# Patient Record
Sex: FEMALE | Race: BLACK OR AFRICAN AMERICAN | NOT HISPANIC OR LATINO | Employment: OTHER | URBAN - METROPOLITAN AREA
[De-identification: names, ages, dates, MRNs, and addresses within clinical notes are randomized per-mention and may not be internally consistent; named-entity substitution may affect disease eponyms.]

---

## 2023-03-04 ENCOUNTER — TRANSFERRED RECORDS (OUTPATIENT)
Dept: HEALTH INFORMATION MANAGEMENT | Facility: CLINIC | Age: 78
End: 2023-03-04
Payer: COMMERCIAL

## 2023-03-13 ENCOUNTER — MEDICAL CORRESPONDENCE (OUTPATIENT)
Dept: HEALTH INFORMATION MANAGEMENT | Facility: CLINIC | Age: 78
End: 2023-03-13
Payer: COMMERCIAL

## 2023-03-16 ENCOUNTER — APPOINTMENT (OUTPATIENT)
Dept: INTERPRETER SERVICES | Facility: CLINIC | Age: 78
End: 2023-03-16
Payer: COMMERCIAL

## 2023-03-16 DIAGNOSIS — I10 ESSENTIAL HYPERTENSION: Primary | ICD-10-CM

## 2023-03-16 NOTE — TELEPHONE ENCOUNTER
RECORDS RECEIVED FROM:    DATE RECEIVED:    NOTES STATUS DETAILS   OFFICE NOTE from referring provider  In process    OFFICE NOTE from other cardiologists  N/A    RECORDS from hospital/ED N/A    MEDICATION LIST Internal    GENERAL CARDIO RECORDS   (ALL APPOINTMENT TYPES)     LABS (CBC,BMP,CMP, TSH) Internal    EKG (STRIPS & REPORTS) In process    MONITORS (STRIPS & REPORTS) N/A    ECHOS (IMAGES AND REPORTS) N/A    STRESS TESTS (IMAGES AND REPORTS) N/A    cMRI (IMAGES AND REPORTS) N/A    CT/CTA (IMAGES AND REPORTS) N/A      Action 3/16/23 [facility]  Fax #[phone]   Action Taken Requested office notes, lab work, and EKGs    Called clinic to get records, said they'll send ASAP. 3/16

## 2023-03-17 ENCOUNTER — OFFICE VISIT (OUTPATIENT)
Dept: CARDIOLOGY | Facility: CLINIC | Age: 78
End: 2023-03-17
Attending: STUDENT IN AN ORGANIZED HEALTH CARE EDUCATION/TRAINING PROGRAM
Payer: COMMERCIAL

## 2023-03-17 ENCOUNTER — PRE VISIT (OUTPATIENT)
Dept: CARDIOLOGY | Facility: CLINIC | Age: 78
End: 2023-03-17
Payer: COMMERCIAL

## 2023-03-17 VITALS
SYSTOLIC BLOOD PRESSURE: 159 MMHG | WEIGHT: 211 LBS | HEART RATE: 58 BPM | OXYGEN SATURATION: 98 % | HEIGHT: 62 IN | DIASTOLIC BLOOD PRESSURE: 83 MMHG | BODY MASS INDEX: 38.83 KG/M2

## 2023-03-17 DIAGNOSIS — E78.5 HYPERLIPIDEMIA, UNSPECIFIED HYPERLIPIDEMIA TYPE: ICD-10-CM

## 2023-03-17 DIAGNOSIS — I10 HYPERTENSION, UNSPECIFIED TYPE: Primary | ICD-10-CM

## 2023-03-17 PROCEDURE — 99205 OFFICE O/P NEW HI 60 MIN: CPT | Performed by: STUDENT IN AN ORGANIZED HEALTH CARE EDUCATION/TRAINING PROGRAM

## 2023-03-17 PROCEDURE — 93005 ELECTROCARDIOGRAM TRACING: CPT

## 2023-03-17 PROCEDURE — G0463 HOSPITAL OUTPT CLINIC VISIT: HCPCS | Mod: 25 | Performed by: STUDENT IN AN ORGANIZED HEALTH CARE EDUCATION/TRAINING PROGRAM

## 2023-03-17 RX ORDER — CHLORTHALIDONE 25 MG/1
12.5 TABLET ORAL DAILY
Qty: 45 TABLET | Refills: 3 | Status: SHIPPED | OUTPATIENT
Start: 2023-03-17

## 2023-03-17 RX ORDER — CHOLECALCIFEROL (VITAMIN D3) 50 MCG
2 TABLET ORAL DAILY
COMMUNITY

## 2023-03-17 RX ORDER — LOSARTAN POTASSIUM 25 MG/1
25 TABLET ORAL DAILY
COMMUNITY
End: 2023-03-17

## 2023-03-17 RX ORDER — LOSARTAN POTASSIUM 50 MG/1
50 TABLET ORAL DAILY
Qty: 90 TABLET | Refills: 3 | Status: SHIPPED | OUTPATIENT
Start: 2023-03-17 | End: 2023-03-17

## 2023-03-17 RX ORDER — CHLORTHALIDONE 25 MG/1
12.5 TABLET ORAL DAILY
Qty: 45 TABLET | Refills: 3 | Status: SHIPPED | OUTPATIENT
Start: 2023-03-17 | End: 2023-03-17

## 2023-03-17 RX ORDER — UBIDECARENONE 75 MG
100 CAPSULE ORAL DAILY
COMMUNITY

## 2023-03-17 RX ORDER — LOSARTAN POTASSIUM 50 MG/1
50 TABLET ORAL DAILY
Qty: 90 TABLET | Refills: 3 | Status: SHIPPED | OUTPATIENT
Start: 2023-03-17

## 2023-03-17 NOTE — NURSING NOTE
March 17, 2023      Medication Changes:   -Increase losartan to 50 mg daily  -Start chlorthalidone 12.5 mg daily (1/2 tablet)      Follow Up:   -Fasting labs in 1 week  -Echocardiogram when able  -Follow up with Dr. Stauffer in 4 weeks      Patient verbalized understanding of all health information given and agreed to call with further questions or concerns.      Marie Alvarado RN

## 2023-03-17 NOTE — NURSING NOTE
"Chief Complaint   Patient presents with     New Patient     New [place] pt, self-referred d/t \"longstanding HTN.\"          Vitals were taken and medications reconciled.     Amelie Elena, Visit Facilitator    12:57 PM    "

## 2023-03-17 NOTE — LETTER
Date:March 20, 2023      Patient was self referred, no letter generated. Do not send.        [facility] Health Information

## 2023-03-17 NOTE — PATIENT INSTRUCTIONS
March 17, 2023    Cardiology Provider You Saw During Your Visit: Dr. Stauffer      Medication Changes:   -Increase losartan to 50 mg daily  -Start chlorthalidone 12.5 mg daily (1/2 tablet)      Follow Up:   -Fasting labs in 1 week  -Echocardiogram when able  -Follow up with Dr. Stauffer in 4 weeks        Follow the American Heart Association Diet and Lifestyle Recommendations:  -Limit saturated fat, trans fat, sodium, red meat, sweets and sugar-sweetened beverages. If you choose to eat red meat, compare labels and select the leanest cuts available.  -Aim for at least 150 minutes of moderate physical activity or 75 minutes of vigorous physical activity - or an equal combination of both - each week.      To Reach Us:  -During business hours: [phone], press option # 1 to schedule an appointment or to leave a message for your care team.     -After hours, weekends or holidays: [phone], press option #4 and ask to speak to the on-call cardiologist. Inform them you are a patient at the [facility].      Marie Alvarado RN  Cardiology Care Coordinator - General Cardiology  [facility]

## 2023-03-17 NOTE — LETTER
"3/17/2023      RE: John Lizama  [address]  [place]  [place] [address]       Dear Colleague,    Thank you for the opportunity to participate in the care of your patient, John Lizama, at the [facility] [place] at . Please see a copy of my visit note below.    Chief complaint: Hypertension     HPI:   John Lizama is a 78 year old female with history of hypertension who presents for evaluation.      Patient reports that is doing well overall denies chest pain or shortness of breath, her main complain is back pain.   She does have hypertension, recently started treatment but is taking low doses per report.     CURRENT MEDICATIONS:  Current Outpatient Medications   Medication Sig Dispense Refill     cyanocobalamin (VITAMIN B-12) 100 MCG tablet Take 100 mcg by mouth daily       losartan (COZAAR) 25 MG tablet Take 25 mg by mouth daily       magnesium 100 MG TABS        omeprazole (PRILOSEC) 20 MG DR capsule Take 20 mg by mouth daily       vitamin D3 (CHOLECALCIFEROL) 50 mcg (2000 units) tablet Take 2 tablets by mouth daily         PAST SURGICAL HISTORY:  No past surgical history on file.    ALLERGIES:   No Known Allergies    SOCIAL HISTORY:  Social History     Tobacco Use     Smoking status: Never     Passive exposure: Never     Smokeless tobacco: Never       ROS:   A comprehensive 14 point review of systems is negative other than as mentioned in HPI.    Exam:  BP (!) 159/83 (BP Location: Right arm, Patient Position: Sitting, Cuff Size: Adult Large)   Pulse 58   Ht 1.58 m (5' 2.21\")   Wt 95.7 kg (211 lb)   SpO2 98%   BMI 38.34 kg/m    GENERAL APPEARANCE: healthy, alert and no distress  EYES: no icterus, no xanthelasmas  NECK: JVP not elevated  RESPIRATORY: lungs clear to auscultation - no rales, rhonchi or wheezes, no use of accessory muscles, no retractions, respirations are unlabored, normal respiratory " rate  CARDIOVASCULAR: regular rhythm, normal S1 with physiologic split S2, no S3 or S4 and no murmur, click or rub.  GI: soft, non tender, bowel sounds normal,no abdominal bruits  EXTREMITIES: no edema, no bruits    Labs:  Reviewed.     Assessment and Plan:   John Lizama is a 78 year old female with history of hypertension who presents for evaluation.      Hypertension blood pressure above goal today  -  Increase Losartan to 50 mg   - Start chorthalidone   - BMP in one week  - Follow up in 4 to 6 weeks  - Echocardiogram     Lipid panel     Chart review time: 30 minutes  Visit time: 30 minutes  Total time spent: 60 minutes  >50% of this 30-minute visit were spent with the patient on in-person counseling and discussion regarding hypertension and coronary disease.     The patient states understanding and is agreeable with plan.     Damon Stauffer MD  Cardiology    CC            Please do not hesitate to contact me if you have any questions/concerns.     Sincerely,     Xiomy Burt MD

## 2023-03-19 LAB
ATRIAL RATE - MUSE: 68 BPM
DIASTOLIC BLOOD PRESSURE - MUSE: NORMAL MMHG
INTERPRETATION ECG - MUSE: NORMAL
P AXIS - MUSE: 47 DEGREES
PR INTERVAL - MUSE: 168 MS
QRS DURATION - MUSE: 130 MS
QT - MUSE: 402 MS
QTC - MUSE: 427 MS
R AXIS - MUSE: 27 DEGREES
SYSTOLIC BLOOD PRESSURE - MUSE: NORMAL MMHG
T AXIS - MUSE: 226 DEGREES
VENTRICULAR RATE- MUSE: 68 BPM

## 2023-03-24 ENCOUNTER — LAB (OUTPATIENT)
Dept: LAB | Facility: CLINIC | Age: 78
End: 2023-03-24
Attending: STUDENT IN AN ORGANIZED HEALTH CARE EDUCATION/TRAINING PROGRAM
Payer: COMMERCIAL

## 2023-03-24 DIAGNOSIS — E78.5 HYPERLIPIDEMIA, UNSPECIFIED HYPERLIPIDEMIA TYPE: ICD-10-CM

## 2023-03-24 DIAGNOSIS — I10 HYPERTENSION, UNSPECIFIED TYPE: ICD-10-CM

## 2023-03-24 LAB
ANION GAP SERPL CALCULATED.3IONS-SCNC: 10 MMOL/L (ref 7–15)
BUN SERPL-MCNC: 13.4 MG/DL (ref 8–23)
CALCIUM SERPL-MCNC: 10 MG/DL (ref 8.8–10.2)
CHLORIDE SERPL-SCNC: 99 MMOL/L (ref 98–107)
CHOLEST SERPL-MCNC: 196 MG/DL
CREAT SERPL-MCNC: 0.59 MG/DL (ref 0.51–0.95)
DEPRECATED HCO3 PLAS-SCNC: 25 MMOL/L (ref 22–29)
GFR SERPL CREATININE-BSD FRML MDRD: >90 ML/MIN/1.73M2
GLUCOSE SERPL-MCNC: 103 MG/DL (ref 70–99)
HDLC SERPL-MCNC: 63 MG/DL
LDLC SERPL CALC-MCNC: 114 MG/DL
NONHDLC SERPL-MCNC: 133 MG/DL
POTASSIUM SERPL-SCNC: 3.9 MMOL/L (ref 3.4–5.3)
SODIUM SERPL-SCNC: 134 MMOL/L (ref 136–145)
TRIGL SERPL-MCNC: 96 MG/DL

## 2023-03-24 PROCEDURE — 36415 COLL VENOUS BLD VENIPUNCTURE: CPT | Performed by: PATHOLOGY

## 2023-03-24 PROCEDURE — 80061 LIPID PANEL: CPT | Performed by: PATHOLOGY

## 2023-03-24 PROCEDURE — 80048 BASIC METABOLIC PNL TOTAL CA: CPT | Performed by: PATHOLOGY

## 2023-05-26 ENCOUNTER — ANCILLARY PROCEDURE (OUTPATIENT)
Dept: CARDIOLOGY | Facility: CLINIC | Age: 78
End: 2023-05-26
Attending: STUDENT IN AN ORGANIZED HEALTH CARE EDUCATION/TRAINING PROGRAM
Payer: COMMERCIAL

## 2023-05-26 VITALS
HEIGHT: 62 IN | WEIGHT: 212 LBS | DIASTOLIC BLOOD PRESSURE: 84 MMHG | SYSTOLIC BLOOD PRESSURE: 139 MMHG | HEART RATE: 74 BPM | BODY MASS INDEX: 39.01 KG/M2 | OXYGEN SATURATION: 99 %

## 2023-05-26 DIAGNOSIS — E78.5 HYPERLIPIDEMIA, UNSPECIFIED HYPERLIPIDEMIA TYPE: ICD-10-CM

## 2023-05-26 DIAGNOSIS — I10 ESSENTIAL HYPERTENSION: ICD-10-CM

## 2023-05-26 DIAGNOSIS — I10 HYPERTENSION, UNSPECIFIED TYPE: ICD-10-CM

## 2023-05-26 LAB — LVEF ECHO: NORMAL

## 2023-05-26 PROCEDURE — 93306 TTE W/DOPPLER COMPLETE: CPT | Mod: GC | Performed by: INTERNAL MEDICINE

## 2023-05-26 PROCEDURE — 99215 OFFICE O/P EST HI 40 MIN: CPT | Performed by: STUDENT IN AN ORGANIZED HEALTH CARE EDUCATION/TRAINING PROGRAM

## 2023-05-26 PROCEDURE — G0463 HOSPITAL OUTPT CLINIC VISIT: HCPCS | Performed by: STUDENT IN AN ORGANIZED HEALTH CARE EDUCATION/TRAINING PROGRAM

## 2023-05-26 RX ORDER — FOLIC ACID 1 MG/1
1 TABLET ORAL
COMMUNITY
Start: 2022-01-06

## 2023-05-26 RX ORDER — LANOLIN ALCOHOL/MO/W.PET/CERES
CREAM (GRAM) TOPICAL
COMMUNITY
Start: 2023-02-25

## 2023-05-26 RX ORDER — PREDNISOLONE ACETATE 10 MG/ML
1 SUSPENSION/ DROPS OPHTHALMIC
COMMUNITY
Start: 2023-05-11

## 2023-05-26 RX ORDER — CYCLOBENZAPRINE HCL 10 MG
TABLET ORAL
COMMUNITY

## 2023-05-26 RX ORDER — AMLODIPINE AND VALSARTAN 5; 160 MG/1; MG/1
1 TABLET ORAL
COMMUNITY
Start: 2023-02-25

## 2023-05-26 RX ORDER — HYDROCORTISONE 25 MG/ML
LOTION TOPICAL
COMMUNITY
Start: 2022-12-09

## 2023-05-26 RX ORDER — GABAPENTIN 100 MG/1
CAPSULE ORAL
COMMUNITY
Start: 2022-06-24

## 2023-05-26 RX ORDER — LISINOPRIL AND HYDROCHLOROTHIAZIDE 12.5; 2 MG/1; MG/1
TABLET ORAL
COMMUNITY

## 2023-05-26 RX ORDER — PANTOPRAZOLE SODIUM 40 MG/1
TABLET, DELAYED RELEASE ORAL
COMMUNITY
Start: 2023-04-27

## 2023-05-26 RX ORDER — ATORVASTATIN CALCIUM 10 MG/1
10 TABLET, FILM COATED ORAL DAILY
Qty: 90 TABLET | Refills: 3 | Status: SHIPPED | OUTPATIENT
Start: 2023-05-26

## 2023-05-26 RX ORDER — LOSARTAN POTASSIUM AND HYDROCHLOROTHIAZIDE 12.5; 1 MG/1; MG/1
TABLET ORAL
COMMUNITY

## 2023-05-26 RX ORDER — OMEPRAZOLE 40 MG/1
CAPSULE, DELAYED RELEASE ORAL
COMMUNITY
Start: 2022-08-31

## 2023-05-26 RX ORDER — FERROUS SULFATE 325(65) MG
TABLET ORAL
COMMUNITY

## 2023-05-26 RX ORDER — SENNA AND DOCUSATE SODIUM 50; 8.6 MG/1; MG/1
TABLET, FILM COATED ORAL
COMMUNITY

## 2023-05-26 RX ORDER — OFLOXACIN 3 MG/ML
SOLUTION/ DROPS OPHTHALMIC
COMMUNITY
Start: 2023-05-11

## 2023-05-26 RX ORDER — ACETAMINOPHEN 160 MG
1 TABLET,DISINTEGRATING ORAL DAILY
COMMUNITY
Start: 2022-06-24

## 2023-05-26 RX ORDER — PREDNISONE 2.5 MG/1
1 TABLET ORAL EVERY 24 HOURS
COMMUNITY
Start: 2022-01-06

## 2023-05-26 RX ORDER — ACETAMINOPHEN 500 MG
TABLET ORAL
COMMUNITY

## 2023-05-26 RX ORDER — CAPSAICIN 0.025 %
CREAM (GRAM) TOPICAL
COMMUNITY
Start: 2022-06-07

## 2023-05-26 ASSESSMENT — PAIN SCALES - GENERAL: PAINLEVEL: NO PAIN (0)

## 2023-05-26 NOTE — NURSING NOTE
May 26, 2023    Medication Changes: Start atorvastatin 10 mg daily      Follow Up:   With Dr. Stauffer in 6-12 months with fasting labs and echocardiogram prior    Patient verbalized understanding of all health information given and agreed to call with further questions or concerns.      Marie Alvarado RN

## 2023-05-26 NOTE — PATIENT INSTRUCTIONS
May 26, 2023    Cardiology Provider You Saw During Your Visit: Dr. Stauffer      Medication Changes: Start atorvastatin 10 mg daily      Follow Up:   With Dr. Stauffer in 6-12 months with fasting labs and echocardiogram prior      Follow the American Heart Association Diet and Lifestyle Recommendations:  -Limit saturated fat, trans fat, sodium, red meat, sweets and sugar-sweetened beverages. If you choose to eat red meat, compare labels and select the leanest cuts available.  -Aim for at least 150 minutes of moderate physical activity or 75 minutes of vigorous physical activity - or an equal combination of both - each week.      To Reach Us:  -During business hours: [phone], press option # 1 to schedule an appointment or to leave a message for your care team.     -After hours, weekends or holidays: [phone], press option #4 and ask to speak to the on-call cardiologist. Inform them you are a patient at the [facility].        **If you have a cardiac device, please make sure you schedule an in-person device check just prior to your cardiology provider appointments**        Marie Alvarado RN  Cardiology Care Coordinator - General Cardiology  [facility]

## 2023-05-26 NOTE — NURSING NOTE
"Chief Complaint   Patient presents with     FU Cardiac testing      2 month follow-up with echo prior        Initial /84   Pulse 74   Ht 1.58 m (5' 2.2\")   Wt 96.2 kg (212 lb)   SpO2 99%   BMI 38.53 kg/m   Estimated body mass index is 38.53 kg/m  as calculated from the following:    Height as of this encounter: 1.58 m (5' 2.2\").    Weight as of this encounter: 96.2 kg (212 lb)..  BP completed using cuff size: trang Tejada CMA (AAMA)  "

## 2023-05-26 NOTE — PROGRESS NOTES
"Chief complaint: Hypertension     HPI:   John Lizama is a 78 year old female with history of hypertension who presents for evaluation.      Patient reports that is doing well overall denies chest pain or shortness of breath, her main complain is back pain.   She does have hypertension, recently started treatment but is taking low doses per report.     CURRENT MEDICATIONS:  Current Outpatient Medications   Medication Sig Dispense Refill     chlorthalidone (HYGROTON) 25 MG tablet Take 0.5 tablets (12.5 mg) by mouth daily 45 tablet 3     cyanocobalamin (VITAMIN B-12) 100 MCG tablet Take 100 mcg by mouth daily       losartan (COZAAR) 50 MG tablet Take 1 tablet (50 mg) by mouth daily 90 tablet 3     magnesium 100 MG TABS        omeprazole (PRILOSEC) 20 MG DR capsule Take 20 mg by mouth daily       vitamin D3 (CHOLECALCIFEROL) 50 mcg (2000 units) tablet Take 2 tablets by mouth daily         PAST SURGICAL HISTORY:  No past surgical history on file.    ALLERGIES:   No Known Allergies    SOCIAL HISTORY:  Social History     Tobacco Use     Smoking status: Never     Passive exposure: Never     Smokeless tobacco: Never       ROS:   A comprehensive 14 point review of systems is negative other than as mentioned in HPI.    Exam:  /84   Pulse 74   Ht 1.58 m (5' 2.2\")   Wt 96.2 kg (212 lb)   SpO2 99%   BMI 38.53 kg/m    GENERAL APPEARANCE: healthy, alert and no distress  EYES: no icterus, no xanthelasmas  NECK: JVP not elevated  RESPIRATORY: lungs clear to auscultation - no rales, rhonchi or wheezes, no use of accessory muscles, no retractions, respirations are unlabored, normal respiratory rate  CARDIOVASCULAR: regular rhythm, normal S1 with physiologic split S2, no S3 or S4 and 2/6 systolic murmur, click or rub.  GI: soft, non tender, bowel sounds normal,no abdominal bruits  EXTREMITIES: no edema, no bruits    Labs:  Reviewed.   LDL Cholesterol Calculated   Date Value Ref Range Status   03/24/2023 114 (H) <=100 " mg/dL Final       Assessment and Plan:   John Lizama is a 78 year old female with history of hypertension who presents for evaluation.      Hypertension blood pressure better controlled.   -  Continue Losartan to 50 mg   - chorthalidone 12.5 mg     HLD was on atorvastatin 10 mg in the past, plan to resume.   - Atorvastatin 10 mg     Chart review time: 30 minutes  Visit time: 30 minutes  Total time spent: 60 minutes  >50% of this 30-minute visit were spent with the patient on in-person counseling and discussion regarding hypertension and coronary disease.     The patient states understanding and is agreeable with plan.     Damon Stauffer MD  Cardiology    CC

## 2023-06-05 ENCOUNTER — TRANSCRIBE ORDERS (OUTPATIENT)
Dept: OTHER | Age: 78
End: 2023-06-05

## 2023-06-05 DIAGNOSIS — I10 ESSENTIAL HYPERTENSION: Primary | ICD-10-CM

## 2023-07-26 NOTE — PROGRESS NOTES
"Chief complaint: Hypertension     HPI:   John Lizama is a 78 year old female with history of hypertension who presents for evaluation.      Patient reports that is doing well overall denies chest pain or shortness of breath, her main complain is back pain.   She does have hypertension, recently started treatment but is taking low doses per report.     CURRENT MEDICATIONS:  Current Outpatient Medications   Medication Sig Dispense Refill     cyanocobalamin (VITAMIN B-12) 100 MCG tablet Take 100 mcg by mouth daily       losartan (COZAAR) 25 MG tablet Take 25 mg by mouth daily       magnesium 100 MG TABS        omeprazole (PRILOSEC) 20 MG DR capsule Take 20 mg by mouth daily       vitamin D3 (CHOLECALCIFEROL) 50 mcg (2000 units) tablet Take 2 tablets by mouth daily         PAST SURGICAL HISTORY:  No past surgical history on file.    ALLERGIES:   No Known Allergies    SOCIAL HISTORY:  Social History     Tobacco Use     Smoking status: Never     Passive exposure: Never     Smokeless tobacco: Never       ROS:   A comprehensive 14 point review of systems is negative other than as mentioned in HPI.    Exam:  BP (!) 159/83 (BP Location: Right arm, Patient Position: Sitting, Cuff Size: Adult Large)   Pulse 58   Ht 1.58 m (5' 2.21\")   Wt 95.7 kg (211 lb)   SpO2 98%   BMI 38.34 kg/m    GENERAL APPEARANCE: healthy, alert and no distress  EYES: no icterus, no xanthelasmas  NECK: JVP not elevated  RESPIRATORY: lungs clear to auscultation - no rales, rhonchi or wheezes, no use of accessory muscles, no retractions, respirations are unlabored, normal respiratory rate  CARDIOVASCULAR: regular rhythm, normal S1 with physiologic split S2, no S3 or S4 and no murmur, click or rub.  GI: soft, non tender, bowel sounds normal,no abdominal bruits  EXTREMITIES: no edema, no bruits    Labs:  Reviewed.     Assessment and Plan:   John Lizama is a 78 year old female with history of hypertension who presents for evaluation.  "     Hypertension blood pressure above goal today  -  Increase Losartan to 50 mg   - Start chorthalidone   - BMP in one week  - Follow up in 4 to 6 weeks  - Echocardiogram     Lipid panel     Chart review time: 30 minutes  Visit time: 30 minutes  Total time spent: 60 minutes  >50% of this 30-minute visit were spent with the patient on in-person counseling and discussion regarding hypertension and coronary disease.     The patient states understanding and is agreeable with plan.     Damon Stauffer MD  Cardiology    CC         36.6

## 2024-07-06 DIAGNOSIS — E78.5 HYPERLIPIDEMIA, UNSPECIFIED HYPERLIPIDEMIA TYPE: ICD-10-CM

## 2024-07-09 RX ORDER — ATORVASTATIN CALCIUM 10 MG/1
10 TABLET, FILM COATED ORAL DAILY
Qty: 90 TABLET | Refills: 3 | OUTPATIENT
Start: 2024-07-09

## 2024-10-21 ENCOUNTER — TELEPHONE (OUTPATIENT)
Dept: PLASTIC SURGERY | Facility: CLINIC | Age: 79
End: 2024-10-21
Payer: COMMERCIAL

## 2024-10-21 NOTE — TELEPHONE ENCOUNTER
[facility]    Phone Message    May a detailed message be left on voicemail: yes     Reason for Call: Other: Pt being referred to plastic surgery from [facility] from Dr Ge, referral not on pt's file. Diagnosis is fluid build up around butt and hips. Writer called backline, per backline nurse send a TE to the clinic.      Action Taken: Other: plast surg     Travel Screening: Not Applicable     Date of Service:

## 2024-10-21 NOTE — TELEPHONE ENCOUNTER
Called pt today with Arabella . Pt did not answer but I was able to leave a voicemail with information that I was calling regarding plastic surgery referral. Dex GEE RN

## 2024-10-22 ENCOUNTER — PATIENT OUTREACH (OUTPATIENT)
Dept: PLASTIC SURGERY | Facility: CLINIC | Age: 79
End: 2024-10-22
Payer: COMMERCIAL

## 2024-10-22 NOTE — PATIENT INSTRUCTIONS
Pt's grand daughter, Priti returned phone call today to discuss plastic surgery referral. As we were discussing the referral, she updated that Dr Ge referred pt to [facility] and they were supposed to call them to make appt, not [facility]. Priti has my contact information if anything changes. Dex GEE RN

## 2024-11-25 NOTE — LETTER
"5/26/2023      RE: John Lizama  [address]  [place]  [facility] [address]       Dear Colleague,    Thank you for the opportunity to participate in the care of your patient, John Lizama, at the [facility] [place] at [facility]. Please see a copy of my visit note below.    Chief complaint: Hypertension     HPI:   John Lizama is a 78 year old female with history of hypertension who presents for evaluation.      Patient reports that is doing well overall denies chest pain or shortness of breath, her main complain is back pain.   She does have hypertension, recently started treatment but is taking low doses per report.     CURRENT MEDICATIONS:  Current Outpatient Medications   Medication Sig Dispense Refill     chlorthalidone (HYGROTON) 25 MG tablet Take 0.5 tablets (12.5 mg) by mouth daily 45 tablet 3     cyanocobalamin (VITAMIN B-12) 100 MCG tablet Take 100 mcg by mouth daily       losartan (COZAAR) 50 MG tablet Take 1 tablet (50 mg) by mouth daily 90 tablet 3     magnesium 100 MG TABS        omeprazole (PRILOSEC) 20 MG DR capsule Take 20 mg by mouth daily       vitamin D3 (CHOLECALCIFEROL) 50 mcg (2000 units) tablet Take 2 tablets by mouth daily         PAST SURGICAL HISTORY:  No past surgical history on file.    ALLERGIES:   No Known Allergies    SOCIAL HISTORY:  Social History     Tobacco Use     Smoking status: Never     Passive exposure: Never     Smokeless tobacco: Never       ROS:   A comprehensive 14 point review of systems is negative other than as mentioned in HPI.    Exam:  /84   Pulse 74   Ht 1.58 m (5' 2.2\")   Wt 96.2 kg (212 lb)   SpO2 99%   BMI 38.53 kg/m    GENERAL APPEARANCE: healthy, alert and no distress  EYES: no icterus, no xanthelasmas  NECK: JVP not elevated  RESPIRATORY: lungs clear to auscultation - no rales, rhonchi or wheezes, no use of accessory muscles, no retractions, respirations are unlabored, " normal respiratory rate  CARDIOVASCULAR: regular rhythm, normal S1 with physiologic split S2, no S3 or S4 and 2/6 systolic murmur, click or rub.  GI: soft, non tender, bowel sounds normal,no abdominal bruits  EXTREMITIES: no edema, no bruits    Labs:  Reviewed.   LDL Cholesterol Calculated   Date Value Ref Range Status   03/24/2023 114 (H) <=100 mg/dL Final       Assessment and Plan:   John Lizama is a 78 year old female with history of hypertension who presents for evaluation.      Hypertension blood pressure better controlled.   -  Continue Losartan to 50 mg   - chorthalidone 12.5 mg     HLD was on atorvastatin 10 mg in the past, plan to resume.   - Atorvastatin 10 mg     Chart review time: 30 minutes  Visit time: 30 minutes  Total time spent: 60 minutes  >50% of this 30-minute visit were spent with the patient on in-person counseling and discussion regarding hypertension and coronary disease.     The patient states understanding and is agreeable with plan.     Damon Stauffer MD  Cardiology    CC            Please do not hesitate to contact me if you have any questions/concerns.     Sincerely,     Xiomy Burt MD     Skin normal color for race, warm, dry and intact. No evidence of rash.

## 2025-02-17 ENCOUNTER — ANESTHESIA EVENT (OUTPATIENT)
Dept: SURGERY | Facility: CLINIC | Age: 80
End: 2025-02-17
Payer: COMMERCIAL

## 2025-02-18 ENCOUNTER — HOSPITAL ENCOUNTER (INPATIENT)
Facility: CLINIC | Age: 80
DRG: 516 | End: 2025-02-18
Attending: STUDENT IN AN ORGANIZED HEALTH CARE EDUCATION/TRAINING PROGRAM | Admitting: STUDENT IN AN ORGANIZED HEALTH CARE EDUCATION/TRAINING PROGRAM
Payer: COMMERCIAL

## 2025-02-18 ENCOUNTER — ANESTHESIA (OUTPATIENT)
Dept: SURGERY | Facility: CLINIC | Age: 80
End: 2025-02-18
Payer: COMMERCIAL

## 2025-02-18 ENCOUNTER — APPOINTMENT (OUTPATIENT)
Dept: GENERAL RADIOLOGY | Facility: CLINIC | Age: 80
DRG: 516 | End: 2025-02-18
Attending: STUDENT IN AN ORGANIZED HEALTH CARE EDUCATION/TRAINING PROGRAM
Payer: COMMERCIAL

## 2025-02-18 DIAGNOSIS — Z98.890 HISTORY OF LUMBAR LAMINECTOMY: Primary | ICD-10-CM

## 2025-02-18 PROBLEM — M48.061 LUMBAR STENOSIS: Status: ACTIVE | Noted: 2025-02-18

## 2025-02-18 LAB
CREAT SERPL-MCNC: 0.85 MG/DL (ref 0.51–0.95)
EGFRCR SERPLBLD CKD-EPI 2021: 68 ML/MIN/1.73M2
LACTATE SERPL-SCNC: 1.9 MMOL/L (ref 0.7–2)

## 2025-02-18 PROCEDURE — 360N000084 HC SURGERY LEVEL 4 W/ FLUORO, PER MIN: Performed by: STUDENT IN AN ORGANIZED HEALTH CARE EDUCATION/TRAINING PROGRAM

## 2025-02-18 PROCEDURE — 250N000009 HC RX 250: Performed by: NURSE ANESTHETIST, CERTIFIED REGISTERED

## 2025-02-18 PROCEDURE — 258N000003 HC RX IP 258 OP 636: Performed by: ANESTHESIOLOGY

## 2025-02-18 PROCEDURE — 258N000003 HC RX IP 258 OP 636: Performed by: STUDENT IN AN ORGANIZED HEALTH CARE EDUCATION/TRAINING PROGRAM

## 2025-02-18 PROCEDURE — 250N000009 HC RX 250: Performed by: STUDENT IN AN ORGANIZED HEALTH CARE EDUCATION/TRAINING PROGRAM

## 2025-02-18 PROCEDURE — 82565 ASSAY OF CREATININE: CPT | Performed by: STUDENT IN AN ORGANIZED HEALTH CARE EDUCATION/TRAINING PROGRAM

## 2025-02-18 PROCEDURE — 370N000017 HC ANESTHESIA TECHNICAL FEE, PER MIN: Performed by: STUDENT IN AN ORGANIZED HEALTH CARE EDUCATION/TRAINING PROGRAM

## 2025-02-18 PROCEDURE — 250N000025 HC SEVOFLURANE, PER MIN: Performed by: STUDENT IN AN ORGANIZED HEALTH CARE EDUCATION/TRAINING PROGRAM

## 2025-02-18 PROCEDURE — 250N000011 HC RX IP 250 OP 636: Performed by: STUDENT IN AN ORGANIZED HEALTH CARE EDUCATION/TRAINING PROGRAM

## 2025-02-18 PROCEDURE — 01NR0ZZ RELEASE SACRAL NERVE, OPEN APPROACH: ICD-10-PCS | Performed by: STUDENT IN AN ORGANIZED HEALTH CARE EDUCATION/TRAINING PROGRAM

## 2025-02-18 PROCEDURE — 01NB0ZZ RELEASE LUMBAR NERVE, OPEN APPROACH: ICD-10-PCS | Performed by: STUDENT IN AN ORGANIZED HEALTH CARE EDUCATION/TRAINING PROGRAM

## 2025-02-18 PROCEDURE — 120N000001 HC R&B MED SURG/OB

## 2025-02-18 PROCEDURE — 999N000141 HC STATISTIC PRE-PROCEDURE NURSING ASSESSMENT: Performed by: STUDENT IN AN ORGANIZED HEALTH CARE EDUCATION/TRAINING PROGRAM

## 2025-02-18 PROCEDURE — 36415 COLL VENOUS BLD VENIPUNCTURE: CPT | Performed by: STUDENT IN AN ORGANIZED HEALTH CARE EDUCATION/TRAINING PROGRAM

## 2025-02-18 PROCEDURE — 250N000011 HC RX IP 250 OP 636: Performed by: NURSE ANESTHETIST, CERTIFIED REGISTERED

## 2025-02-18 PROCEDURE — 250N000013 HC RX MED GY IP 250 OP 250 PS 637: Performed by: STUDENT IN AN ORGANIZED HEALTH CARE EDUCATION/TRAINING PROGRAM

## 2025-02-18 PROCEDURE — 710N000009 HC RECOVERY PHASE 1, LEVEL 1, PER MIN: Performed by: STUDENT IN AN ORGANIZED HEALTH CARE EDUCATION/TRAINING PROGRAM

## 2025-02-18 PROCEDURE — 999N000179 XR SURGERY CARM FLUORO LESS THAN 5 MIN W STILLS

## 2025-02-18 PROCEDURE — 272N000001 HC OR GENERAL SUPPLY STERILE: Performed by: STUDENT IN AN ORGANIZED HEALTH CARE EDUCATION/TRAINING PROGRAM

## 2025-02-18 PROCEDURE — 83605 ASSAY OF LACTIC ACID: CPT | Performed by: STUDENT IN AN ORGANIZED HEALTH CARE EDUCATION/TRAINING PROGRAM

## 2025-02-18 PROCEDURE — 250N000011 HC RX IP 250 OP 636: Performed by: ANESTHESIOLOGY

## 2025-02-18 PROCEDURE — 258N000003 HC RX IP 258 OP 636: Performed by: NURSE ANESTHETIST, CERTIFIED REGISTERED

## 2025-02-18 RX ORDER — FUROSEMIDE 40 MG/1
40 TABLET ORAL DAILY
Status: DISCONTINUED | OUTPATIENT
Start: 2025-02-19 | End: 2025-02-23 | Stop reason: HOSPADM

## 2025-02-18 RX ORDER — FUROSEMIDE 40 MG/1
40 TABLET ORAL DAILY
COMMUNITY
Start: 2024-12-22

## 2025-02-18 RX ORDER — LABETALOL HYDROCHLORIDE 5 MG/ML
5 INJECTION, SOLUTION INTRAVENOUS ONCE
Status: COMPLETED | OUTPATIENT
Start: 2025-02-18 | End: 2025-02-18

## 2025-02-18 RX ORDER — PANTOPRAZOLE SODIUM 40 MG/1
40 TABLET, DELAYED RELEASE ORAL
Status: DISCONTINUED | OUTPATIENT
Start: 2025-02-19 | End: 2025-02-23 | Stop reason: HOSPADM

## 2025-02-18 RX ORDER — CEFAZOLIN SODIUM/WATER 2 G/20 ML
2 SYRINGE (ML) INTRAVENOUS
Status: COMPLETED | OUTPATIENT
Start: 2025-02-18 | End: 2025-02-18

## 2025-02-18 RX ORDER — NALOXONE HYDROCHLORIDE 0.4 MG/ML
0.4 INJECTION, SOLUTION INTRAMUSCULAR; INTRAVENOUS; SUBCUTANEOUS
Status: DISCONTINUED | OUTPATIENT
Start: 2025-02-18 | End: 2025-02-23 | Stop reason: HOSPADM

## 2025-02-18 RX ORDER — ACETAMINOPHEN 325 MG/1
975 TABLET ORAL EVERY 8 HOURS
Status: DISCONTINUED | OUTPATIENT
Start: 2025-02-18 | End: 2025-02-23 | Stop reason: HOSPADM

## 2025-02-18 RX ORDER — LIDOCAINE 40 MG/G
CREAM TOPICAL
Status: DISCONTINUED | OUTPATIENT
Start: 2025-02-18 | End: 2025-02-18 | Stop reason: HOSPADM

## 2025-02-18 RX ORDER — SODIUM CHLORIDE, SODIUM LACTATE, POTASSIUM CHLORIDE, CALCIUM CHLORIDE 600; 310; 30; 20 MG/100ML; MG/100ML; MG/100ML; MG/100ML
INJECTION, SOLUTION INTRAVENOUS CONTINUOUS PRN
Status: DISCONTINUED | OUTPATIENT
Start: 2025-02-18 | End: 2025-02-18

## 2025-02-18 RX ORDER — SODIUM CHLORIDE, SODIUM LACTATE, POTASSIUM CHLORIDE, CALCIUM CHLORIDE 600; 310; 30; 20 MG/100ML; MG/100ML; MG/100ML; MG/100ML
INJECTION, SOLUTION INTRAVENOUS CONTINUOUS
Status: DISCONTINUED | OUTPATIENT
Start: 2025-02-18 | End: 2025-02-18 | Stop reason: HOSPADM

## 2025-02-18 RX ORDER — NALOXONE HYDROCHLORIDE 0.4 MG/ML
0.2 INJECTION, SOLUTION INTRAMUSCULAR; INTRAVENOUS; SUBCUTANEOUS
Status: DISCONTINUED | OUTPATIENT
Start: 2025-02-18 | End: 2025-02-23 | Stop reason: HOSPADM

## 2025-02-18 RX ORDER — CEFAZOLIN SODIUM/WATER 2 G/20 ML
2 SYRINGE (ML) INTRAVENOUS SEE ADMIN INSTRUCTIONS
Status: DISCONTINUED | OUTPATIENT
Start: 2025-02-18 | End: 2025-02-18 | Stop reason: HOSPADM

## 2025-02-18 RX ORDER — BISACODYL 10 MG
10 SUPPOSITORY, RECTAL RECTAL DAILY PRN
Status: DISCONTINUED | OUTPATIENT
Start: 2025-02-21 | End: 2025-02-23 | Stop reason: HOSPADM

## 2025-02-18 RX ORDER — FENTANYL CITRATE 50 UG/ML
INJECTION, SOLUTION INTRAMUSCULAR; INTRAVENOUS PRN
Status: DISCONTINUED | OUTPATIENT
Start: 2025-02-18 | End: 2025-02-18

## 2025-02-18 RX ORDER — METOPROLOL SUCCINATE 25 MG/1
25 TABLET, EXTENDED RELEASE ORAL DAILY
COMMUNITY

## 2025-02-18 RX ORDER — METHOCARBAMOL 500 MG/1
250 TABLET ORAL EVERY 6 HOURS PRN
Status: DISCONTINUED | OUTPATIENT
Start: 2025-02-18 | End: 2025-02-23 | Stop reason: HOSPADM

## 2025-02-18 RX ORDER — POLYETHYLENE GLYCOL 3350 17 G/17G
17 POWDER, FOR SOLUTION ORAL DAILY
Status: DISCONTINUED | OUTPATIENT
Start: 2025-02-19 | End: 2025-02-23 | Stop reason: HOSPADM

## 2025-02-18 RX ORDER — LIDOCAINE HYDROCHLORIDE 20 MG/ML
INJECTION, SOLUTION INFILTRATION; PERINEURAL PRN
Status: DISCONTINUED | OUTPATIENT
Start: 2025-02-18 | End: 2025-02-18

## 2025-02-18 RX ORDER — AMOXICILLIN 250 MG
1 CAPSULE ORAL 2 TIMES DAILY
Status: DISCONTINUED | OUTPATIENT
Start: 2025-02-18 | End: 2025-02-23 | Stop reason: HOSPADM

## 2025-02-18 RX ORDER — ONDANSETRON 2 MG/ML
INJECTION INTRAMUSCULAR; INTRAVENOUS PRN
Status: DISCONTINUED | OUTPATIENT
Start: 2025-02-18 | End: 2025-02-18

## 2025-02-18 RX ORDER — HYDROMORPHONE HCL IN WATER/PF 6 MG/30 ML
0.2 PATIENT CONTROLLED ANALGESIA SYRINGE INTRAVENOUS EVERY 4 HOURS PRN
Status: DISCONTINUED | OUTPATIENT
Start: 2025-02-18 | End: 2025-02-23 | Stop reason: HOSPADM

## 2025-02-18 RX ORDER — LIDOCAINE 40 MG/G
CREAM TOPICAL
Status: DISCONTINUED | OUTPATIENT
Start: 2025-02-18 | End: 2025-02-23 | Stop reason: HOSPADM

## 2025-02-18 RX ORDER — HYDROMORPHONE HCL IN WATER/PF 6 MG/30 ML
0.1 PATIENT CONTROLLED ANALGESIA SYRINGE INTRAVENOUS EVERY 4 HOURS PRN
Status: DISCONTINUED | OUTPATIENT
Start: 2025-02-18 | End: 2025-02-23 | Stop reason: HOSPADM

## 2025-02-18 RX ORDER — ATORVASTATIN CALCIUM 10 MG/1
10 TABLET, FILM COATED ORAL DAILY
Status: DISCONTINUED | OUTPATIENT
Start: 2025-02-18 | End: 2025-02-23 | Stop reason: HOSPADM

## 2025-02-18 RX ORDER — OXYCODONE HYDROCHLORIDE 5 MG/1
5 TABLET ORAL EVERY 4 HOURS PRN
Status: DISCONTINUED | OUTPATIENT
Start: 2025-02-18 | End: 2025-02-23 | Stop reason: HOSPADM

## 2025-02-18 RX ORDER — LABETALOL HYDROCHLORIDE 5 MG/ML
10 INJECTION, SOLUTION INTRAVENOUS ONCE
Status: COMPLETED | OUTPATIENT
Start: 2025-02-18 | End: 2025-02-18

## 2025-02-18 RX ORDER — BUPIVACAINE HYDROCHLORIDE AND EPINEPHRINE 2.5; 5 MG/ML; UG/ML
INJECTION, SOLUTION INFILTRATION; PERINEURAL PRN
Status: DISCONTINUED | OUTPATIENT
Start: 2025-02-18 | End: 2025-02-18 | Stop reason: HOSPADM

## 2025-02-18 RX ORDER — PROPOFOL 10 MG/ML
INJECTION, EMULSION INTRAVENOUS PRN
Status: DISCONTINUED | OUTPATIENT
Start: 2025-02-18 | End: 2025-02-18

## 2025-02-18 RX ORDER — GABAPENTIN 100 MG/1
100 CAPSULE ORAL
Status: COMPLETED | OUTPATIENT
Start: 2025-02-18 | End: 2025-02-18

## 2025-02-18 RX ORDER — CEFAZOLIN SODIUM 2 G/50ML
2 SOLUTION INTRAVENOUS EVERY 8 HOURS
Status: COMPLETED | OUTPATIENT
Start: 2025-02-18 | End: 2025-02-19

## 2025-02-18 RX ORDER — TRANEXAMIC ACID 10 MG/ML
1 INJECTION, SOLUTION INTRAVENOUS CONTINUOUS
Status: DISCONTINUED | OUTPATIENT
Start: 2025-02-18 | End: 2025-02-18 | Stop reason: HOSPADM

## 2025-02-18 RX ADMIN — SODIUM CHLORIDE, POTASSIUM CHLORIDE, SODIUM LACTATE AND CALCIUM CHLORIDE: 600; 310; 30; 20 INJECTION, SOLUTION INTRAVENOUS at 11:47

## 2025-02-18 RX ADMIN — Medication 2 G: at 11:27

## 2025-02-18 RX ADMIN — ROCURONIUM BROMIDE 40 MG: 50 INJECTION, SOLUTION INTRAVENOUS at 08:41

## 2025-02-18 RX ADMIN — ONDANSETRON 4 MG: 2 INJECTION INTRAMUSCULAR; INTRAVENOUS at 11:19

## 2025-02-18 RX ADMIN — ATORVASTATIN CALCIUM 10 MG: 10 TABLET, FILM COATED ORAL at 18:16

## 2025-02-18 RX ADMIN — ACETAMINOPHEN 975 MG: 325 TABLET, FILM COATED ORAL at 18:16

## 2025-02-18 RX ADMIN — TRANEXAMIC ACID 1 G: 1 INJECTION, SOLUTION INTRAVENOUS at 07:35

## 2025-02-18 RX ADMIN — TRANEXAMIC ACID 1 MG/KG/HR: 10 INJECTION, SOLUTION INTRAVENOUS at 08:20

## 2025-02-18 RX ADMIN — CEFAZOLIN SODIUM 2 G: 2 SOLUTION INTRAVENOUS at 19:33

## 2025-02-18 RX ADMIN — PHENYLEPHRINE HYDROCHLORIDE 50 MCG: 10 INJECTION INTRAVENOUS at 08:43

## 2025-02-18 RX ADMIN — LABETALOL HYDROCHLORIDE 10 MG: 5 INJECTION INTRAVENOUS at 12:24

## 2025-02-18 RX ADMIN — OXYCODONE HYDROCHLORIDE 5 MG: 5 TABLET ORAL at 18:16

## 2025-02-18 RX ADMIN — LABETALOL HYDROCHLORIDE 5 MG: 5 INJECTION INTRAVENOUS at 13:06

## 2025-02-18 RX ADMIN — ROCURONIUM BROMIDE 60 MG: 50 INJECTION, SOLUTION INTRAVENOUS at 07:38

## 2025-02-18 RX ADMIN — SODIUM CHLORIDE, POTASSIUM CHLORIDE, SODIUM LACTATE AND CALCIUM CHLORIDE: 600; 310; 30; 20 INJECTION, SOLUTION INTRAVENOUS at 07:30

## 2025-02-18 RX ADMIN — SODIUM CHLORIDE, POTASSIUM CHLORIDE, SODIUM LACTATE AND CALCIUM CHLORIDE: 600; 310; 30; 20 INJECTION, SOLUTION INTRAVENOUS at 07:15

## 2025-02-18 RX ADMIN — ROCURONIUM BROMIDE 20 MG: 50 INJECTION, SOLUTION INTRAVENOUS at 09:26

## 2025-02-18 RX ADMIN — PHENYLEPHRINE HYDROCHLORIDE 0.2 MCG/KG/MIN: 10 INJECTION INTRAVENOUS at 08:45

## 2025-02-18 RX ADMIN — FENTANYL CITRATE 50 MCG: 50 INJECTION INTRAMUSCULAR; INTRAVENOUS at 08:30

## 2025-02-18 RX ADMIN — PROPOFOL 140 MG: 10 INJECTION, EMULSION INTRAVENOUS at 07:38

## 2025-02-18 RX ADMIN — LIDOCAINE HYDROCHLORIDE 100 MG: 20 INJECTION, SOLUTION INFILTRATION; PERINEURAL at 07:38

## 2025-02-18 RX ADMIN — HYDROMORPHONE HYDROCHLORIDE 0.5 MG: 1 INJECTION, SOLUTION INTRAMUSCULAR; INTRAVENOUS; SUBCUTANEOUS at 11:13

## 2025-02-18 RX ADMIN — Medication 200 MG: at 11:43

## 2025-02-18 RX ADMIN — Medication 2 G: at 07:45

## 2025-02-18 RX ADMIN — HYDROMORPHONE HYDROCHLORIDE 0.5 MG: 1 INJECTION, SOLUTION INTRAMUSCULAR; INTRAVENOUS; SUBCUTANEOUS at 09:49

## 2025-02-18 RX ADMIN — GABAPENTIN 100 MG: 100 CAPSULE ORAL at 07:14

## 2025-02-18 RX ADMIN — FENTANYL CITRATE 50 MCG: 50 INJECTION INTRAMUSCULAR; INTRAVENOUS at 07:38

## 2025-02-18 RX ADMIN — PHENYLEPHRINE HYDROCHLORIDE 50 MCG: 10 INJECTION INTRAVENOUS at 08:50

## 2025-02-18 ASSESSMENT — LIFESTYLE VARIABLES: TOBACCO_USE: 0

## 2025-02-18 ASSESSMENT — ACTIVITIES OF DAILY LIVING (ADL)
ADLS_ACUITY_SCORE: 21
ADLS_ACUITY_SCORE: 25
ADLS_ACUITY_SCORE: 21
ADLS_ACUITY_SCORE: 21
ADLS_ACUITY_SCORE: 25
ADLS_ACUITY_SCORE: 21
ADLS_ACUITY_SCORE: 41
ADLS_ACUITY_SCORE: 21
ADLS_ACUITY_SCORE: 25
ADLS_ACUITY_SCORE: 21
ADLS_ACUITY_SCORE: 25

## 2025-02-18 NOTE — OP NOTE
Orthopedic Surgery Operative Report    Patient:   John Lizama  MRN:   [id number]   :  1945  Facility: [facility]   Date:  25        PREOPERATIVE DIAGNOSIS:    L3-S1 severe central stenosis   Lumbar radiculopathy   Neurogenic claudication     POSTOPERATIVE DIAGNOSIS:    Same     PROCEDURE PERFORMED:    1.Bilateral inferior L3 Laminectomy   2. Bilateral superior L4 laminectomy   3.Bilateral  inferior L4 laminectomy   4. Bilateral L5 superior laminectomy   5.Bilateral L5 inferior laminectomy   6. Bilateral S1 superior laminectomy   7. Bilateral L3-S1  medial facetectomy subarticular decompression   8. Bilateral L3-S1 foraminotomy and decompression   9.Use of intraoperative microscope  10. Portable X-ray     SURGEON:    Joel Ge MD    FIRST ASSISTANT:  Polly Valente PA-C, whose was critical for positioning, retraction during exposure, placement of implants, and closure.    ANESTHESIA:  General     FINDINGS:    Severe lateral recess stenosis at multiple levels  Thin translucent dura.  Ligamentum flavum hypertrophy     COMPLICATIONS:     None    ESTIMATED BLOOD LOSS:   Less than 200 ml    INDICATION FOR OPERATION:  John Lizama is a 80 year old who presented to my clinic with radiculopathy, neurogenic claudication and weakness.  Despite non operative treatment the patient continued to have increased pain and weakness resulting in a left-sided plantar flexion weakness and falls.After reviewing the imaging studies and examination, the decision was made to proceed with the above procedure. The patient understood the risk of surgery to be infection, CSF leak, nerve root injury, failure of improvement of his symptoms. The patient voiced understanding and wanted to proceed.     DESCRIPTION OF PROCEDURE:    The patient was seen in the preoperative holding area. The patient was again given the opportunity to ask questions regarding procedural detail, risks and benefits, expected  post-operative recovery. All questions were answered and informed consent was signed. The low back was marked with indelible ink at the anticipated level. The patient was then transferred back to the operative suite on a gurney. After satisfactory general anesthesia, the patient was carefully placed prone onto the Christopher frame. All bony prominences were well-padded. The back was prepped and draped in the usual sterile manner.   Prior to incision, a critical pause was observed to identify the correct patient, correct procedure, correct level and that appropriate imaging studies were available. I also confirmed that the patient received appropriate pre-operative prophylactic antibiotics. All in the room were in agreement.  An 18 gauge spinal needle and its trochar were placed through the skin at the anticipated level and a lateral C-arm imagine was taken to verify the starting point for the skin incision. A maker was used to neto out the incision.     The skin and subcutaneous tissue was incised with 10-blade. Further dissection with electrocautery was used to reach the paraspinal fascia.  The fascia was incised on the midline. Chapman and bovie used to subperiosteally dissect the paraspinal muscles off the of the spine exposing the lamina. A metallic instrument was held up to the presumed Joint of L3-L4 and  L5-S1 and another lateral C-arm image taken to confirm this was correct. A high speed francine was used to make a permanent neto on the lamina.      Next, while using an operating microscope for visualization, a high francine was then used to create laminectomies and to initiate a partial medial facetectomy on the bilaterally from L3-S1. Kerrison rongeur was used to remove the hypertrophied ligamentum flavum and additional lamina. The majority of the facet joint and the pars is preserved for stability purposes. The L3-S1 nerves were identified, the nerve/thecal sac was retracted medially with a penfield 4 to the lateral  aspects. neuroforaminotomies were also performed. The traversing nerve and common dural tube was free to mobilize. [place] maneur was performed multiple times without any indication of a tear/leak. Hemostasis was achieved with Floseal and bipolar cautery and the wound was copiously irrigated again. A hemovac drain was placed. 0.25% Marcaine was injected into the paraspinal muscles and subcutaneous tissue at the surgical site for post-operative pain relief.  The wound was then closed carefully using #1 Vicryl suture in the paraspinal fascia, 2-0 Vicryl in the deep dermal layer and 3-0 nylon for skin.  Sterile dressings are applied. All sponge and needle counts were correct in the end. The patient appeared to tolerate the procedure well and was escorted to the recovery room in satisfactory condition.

## 2025-02-18 NOTE — ANESTHESIA PREPROCEDURE EVALUATION
Anesthesia Pre-Procedure Evaluation    Patient: John Lizama   MRN: [id number] : 1945        Procedure : Procedure(s):  Decompression lumbar three+ levels          Past Medical History:   Diagnosis Date    Allergic rhinitis     Anemia     Arthralgia of shoulder     Bilateral leg edema     Chronic diastolic congestive heart failure (H)     Chronic low back pain     Dementia (H)     Gastroesophageal reflux disease without esophagitis     HTN (hypertension)     Hyperlipidemia     Insomnia     Lipoma of buttock     Morbid obesity (H)     Osteoarthritis of left glenohumeral joint     Polymyalgia rheumatica     Primary osteoarthritis of both knees     Spinal stenosis of lumbar region     Venous (peripheral) insufficiency     Vitamin D deficiency       Past Surgical History:   Procedure Laterality Date    COMPLEX KELMAN PHACOEMULSIFICATION INTRAOCULAR LENS IMPLANT, REMOVAL OF EYELID LESION LEFT EYE Left 2023    SI JOINT ASPIRATION Right 2010      No Known Allergies   Social History     Tobacco Use    Smoking status: Never     Passive exposure: Never    Smokeless tobacco: Never   Substance Use Topics    Alcohol use: Never      Wt Readings from Last 1 Encounters:   23 96.2 kg (212 lb)        Anesthesia Evaluation   Pt has had prior anesthetic.     No history of anesthetic complications       ROS/MED HX  ENT/Pulmonary:    (-) tobacco use   Neurologic:     (+)   dementia, peripheral neuropathy, - Lumbar radiculopathy.                           Cardiovascular: Comment: H/o pericarditis    (+) Dyslipidemia hypertension- Peripheral Vascular Disease-   -  - -      CHF etiology: Diastolic                    valvular problems/murmurs type: AS Mod AS.    Previous cardiac testing   Echo: Date: 23 Results:  Interpretation Summary  Left ventricular function is normal.The ejection fraction is 55-60%.  The right ventricle is normal size. Global right ventricular function is  normal.  Mild aortic stenosis  is present. PV 2.5 m/s.  Mild aortic valve calcification is present.      Left Ventricle  Left ventricular function is normal.The ejection fraction is 55-60%. Left  ventricular wall thickness is normal. Left ventricular size is normal.  Relative wall thickness is increased consistent with concentric remodeling.  Left ventricular diastolic function is indeterminate. No regional wall motion  abnormalities are seen.     Right Ventricle  The right ventricle is normal size. Global right ventricular function is  normal.     Atria  Both atria appear normal.     Mitral Valve  Mild mitral annular calcification is present. Trace mitral insufficiency is  present.     Aortic Valve  Mild aortic valve calcification is present. Mild aortic stenosis is present.  The mean AoV pressure gradient is 12.5 mmHg. The calculated aortic valve are  is 1.4 cm^2.     Tricuspid Valve  The tricuspid valve is normal. Trace tricuspid insufficiency is present. The  right ventricular systolic pressure is approximated at 33.2 mmHg plus the  right atrial pressure.     Pulmonic Valve  The pulmonic valve is normal.     Vessels  The aorta root is normal. The thoracic aorta is normal. The inferior vena cava  was normal in size with preserved respiratory variability. IVC diameter <2.1  cm collapsing >50% with sniff suggests a normal RA pressure of 3 mmHg.     Pericardium  No pericardial effusion is present  Stress Test:  Date: Results:    ECG Reviewed:  Date: 3/17/23 Results:  SR w/ LBBB  Cath:  Date: Results:      METS/Exercise Tolerance:     Hematologic:       Musculoskeletal: Comment: Polymyalgia rheumatica  (+)  arthritis,             GI/Hepatic:     (+) GERD,                   Renal/Genitourinary:       Endo:     (+)               Obesity (Class 3),       Psychiatric/Substance Use:       Infectious Disease:       Malignancy:       Other:            Physical Exam    Airway        Mallampati: III   TM distance: > 3 FB   Neck ROM: full   Mouth opening: >  "3 cm    Respiratory Devices and Support         Dental       (+) Multiple crowns, permanant bridges      Cardiovascular          Rhythm and rate: regular and normal     Pulmonary           breath sounds clear to auscultation           OUTSIDE LABS:  CBC: No results found for: \"WBC\", \"HGB\", \"HCT\", \"PLT\"  BMP:   Lab Results   Component Value Date     (L) 03/24/2023    POTASSIUM 3.9 03/24/2023    CHLORIDE 99 03/24/2023    CO2 25 03/24/2023    BUN 13.4 03/24/2023    CR 0.59 03/24/2023     (H) 03/24/2023     COAGS: No results found for: \"PTT\", \"INR\", \"FIBR\"  POC: No results found for: \"BGM\", \"HCG\", \"HCGS\"  HEPATIC: No results found for: \"ALBUMIN\", \"PROTTOTAL\", \"ALT\", \"AST\", \"GGT\", \"ALKPHOS\", \"BILITOTAL\", \"BILIDIRECT\", \"NATAN\"  OTHER:   Lab Results   Component Value Date    TOMI 10.0 03/24/2023       Anesthesia Plan    ASA Status:  3    NPO Status:  NPO Appropriate    Anesthesia Type: General.     - Airway: ETT   Induction: Intravenous.   Maintenance: Balanced.   Techniques and Equipment:     - Airway: Video-Laryngoscope     - Lines/Monitors: 2nd IV, Arterial Line     Consents    Anesthesia Plan(s) and associated risks, benefits, and realistic alternatives discussed. Questions answered and patient/representative(s) expressed understanding.     - Discussed:     - Discussed with:  Patient,             Postoperative Care    Pain management: Oral pain medications, IV analgesics, Multi-modal analgesia.   PONV prophylaxis: Ondansetron (or other 5HT-3)     Comments:               Dayton Dumont MD    I have reviewed the pertinent notes and labs in the chart from the past 30 days and (re)examined the patient.  Any updates or changes from those notes are reflected in this note.    Clinically Significant Risk Factors Present on Admission                 # Drug Induced Platelet Defect: home medication list includes an antiplatelet medication   # Hypertension: Home medication list includes antihypertensive(s) "

## 2025-02-18 NOTE — BRIEF OP NOTE
[facility]    Brief Operative Note    Pre-operative diagnosis: Bilateral stenosis of lateral recess of lumbar spine [M48.061]  Foraminal stenosis of lumbosacral region [M48.07]  Inflammation of lumbosacral plexus [M54.17]  Lumbar radiculopathy, acute [M54.16]  Post-operative diagnosis Same as pre-operative diagnosis    Procedure: Lumbar 3 to Sacral 1 Bilateral Decompression, Bilateral - Spine    Surgeon: Surgeons and Role:     * Luisa Ge MD - Primary     * Polly Valente PA-C - Assisting  Anesthesia: General   Estimated Blood Loss: 200 mL from 2/18/2025  7:30 AM to 2/18/2025 12:02 PM      Drains: Hemovac  Specimens:   ID Type Source Tests Collected by Time Destination   A : Bone and Tissue Tissue Spine, Lumbar OR DOCUMENTATION ONLY Luisa Ge MD 2/18/2025  8:40 AM      Findings:   None.  Complications: None.  Implants: * No implants in log *      Plan:   1.Anticoagulation protocol: Ambulation and SCDs  2.Pain medications:  Current regimen   3.Weight bearing status:  WBAT, AAT  4. Lumbar brace OOB only   5.Continue cares and rehabilitation   6. Trimble to be discontinued POD#1  7. Hospital medicine consult for co-management   8. Post-op abx until drain removal    Report completed by:  LUISA GE MD  Date: 2/18/2025  Time: 4:59 PM

## 2025-02-18 NOTE — ANESTHESIA POSTPROCEDURE EVALUATION
Patient: John Lizama    Procedure: Procedure(s):  Lumbar 3 to Sacral 1 Bilateral Decompression       Anesthesia Type:  General    Note:     Postop Pain Control: Uneventful            Sign Out: Well controlled pain   PONV: No   Neuro/Psych: Uneventful            Sign Out: Acceptable/Baseline neuro status   Airway/Respiratory: Uneventful            Sign Out: Acceptable/Baseline resp. status   CV/Hemodynamics: Uneventful            Sign Out: Acceptable CV status   Other NRE: NONE   DID A NON-ROUTINE EVENT OCCUR?            Last vitals:  Vitals Value Taken Time   /97 02/18/25 1300   Temp     Pulse 78 02/18/25 1301   Resp 24 02/18/25 1301   SpO2 96 % 02/18/25 1301   Vitals shown include unfiled device data.    Electronically Signed By: Dayton Dumont MD  February 18, 2025  1:02 PM

## 2025-02-18 NOTE — ANESTHESIA CARE TRANSFER NOTE
Patient: John Lizama    Procedure: Procedure(s):  Lumbar 3 to Sacral 1 Bilateral Decompression       Diagnosis: Bilateral stenosis of lateral recess of lumbar spine [M48.061]  Foraminal stenosis of lumbosacral region [M48.07]  Inflammation of lumbosacral plexus [M54.17]  Lumbar radiculopathy, acute [M54.16]  Diagnosis Additional Information: No value filed.    Anesthesia Type:   General     Note:    Oropharynx: oropharynx clear of all foreign objects and spontaneously breathing  Level of Consciousness: awake and drowsy  Oxygen Supplementation: face mask  Level of Supplemental Oxygen (L/min / FiO2): 6  Independent Airway: airway patency satisfactory and stable  Dentition: dentition unchanged  Vital Signs Stable: post-procedure vital signs reviewed and stable  Report to RN Given: handoff report given  Patient transferred to: PACU    Handoff Report: Identifed the Patient, Identified the Reponsible Provider, Reviewed the pertinent medical history, Discussed the surgical course, Reviewed Intra-OP anesthesia mangement and issues during anesthesia, Set expectations for post-procedure period and Allowed opportunity for questions and acknowledgement of understanding  Vitals:  Vitals Value Taken Time   /98 02/18/25 1205   Temp     Pulse 101 02/18/25 1208   Resp 23 02/18/25 1208   SpO2 95 % 02/18/25 1208   Vitals shown include unfiled device data.    Electronically Signed By: VALDEMAR Wray CRNA  February 18, 2025  12:09 PM  
Patient

## 2025-02-19 ENCOUNTER — APPOINTMENT (OUTPATIENT)
Dept: PHYSICAL THERAPY | Facility: CLINIC | Age: 80
DRG: 516 | End: 2025-02-19
Attending: STUDENT IN AN ORGANIZED HEALTH CARE EDUCATION/TRAINING PROGRAM
Payer: COMMERCIAL

## 2025-02-19 ENCOUNTER — DOCUMENTATION ONLY (OUTPATIENT)
Dept: ORTHOPEDICS | Facility: CLINIC | Age: 80
End: 2025-02-19
Payer: COMMERCIAL

## 2025-02-19 PROCEDURE — 250N000013 HC RX MED GY IP 250 OP 250 PS 637: Performed by: PHYSICIAN ASSISTANT

## 2025-02-19 PROCEDURE — 250N000011 HC RX IP 250 OP 636: Performed by: STUDENT IN AN ORGANIZED HEALTH CARE EDUCATION/TRAINING PROGRAM

## 2025-02-19 PROCEDURE — 97161 PT EVAL LOW COMPLEX 20 MIN: CPT | Mod: GP

## 2025-02-19 PROCEDURE — 250N000013 HC RX MED GY IP 250 OP 250 PS 637: Performed by: STUDENT IN AN ORGANIZED HEALTH CARE EDUCATION/TRAINING PROGRAM

## 2025-02-19 PROCEDURE — 99253 IP/OBS CNSLTJ NEW/EST LOW 45: CPT | Performed by: PHYSICIAN ASSISTANT

## 2025-02-19 PROCEDURE — 120N000001 HC R&B MED SURG/OB

## 2025-02-19 PROCEDURE — L0637 LSO SC R ANT/POS PNL PRE CST: HCPCS

## 2025-02-19 PROCEDURE — 97530 THERAPEUTIC ACTIVITIES: CPT | Mod: GP

## 2025-02-19 PROCEDURE — 97116 GAIT TRAINING THERAPY: CPT | Mod: GP

## 2025-02-19 RX ORDER — CEFAZOLIN SODIUM 2 G/50ML
2 SOLUTION INTRAVENOUS EVERY 8 HOURS
Status: COMPLETED | OUTPATIENT
Start: 2025-02-19 | End: 2025-02-20

## 2025-02-19 RX ORDER — LOSARTAN POTASSIUM 50 MG/1
50 TABLET ORAL DAILY
Status: DISCONTINUED | OUTPATIENT
Start: 2025-02-19 | End: 2025-02-23 | Stop reason: HOSPADM

## 2025-02-19 RX ORDER — HYDRALAZINE HYDROCHLORIDE 20 MG/ML
10 INJECTION INTRAMUSCULAR; INTRAVENOUS EVERY 4 HOURS PRN
Status: DISCONTINUED | OUTPATIENT
Start: 2025-02-19 | End: 2025-02-23 | Stop reason: HOSPADM

## 2025-02-19 RX ORDER — METOPROLOL SUCCINATE 25 MG/1
25 TABLET, EXTENDED RELEASE ORAL DAILY
Status: DISCONTINUED | OUTPATIENT
Start: 2025-02-19 | End: 2025-02-23 | Stop reason: HOSPADM

## 2025-02-19 RX ADMIN — LOSARTAN POTASSIUM 50 MG: 50 TABLET, FILM COATED ORAL at 09:31

## 2025-02-19 RX ADMIN — OXYCODONE HYDROCHLORIDE 2.5 MG: 5 TABLET ORAL at 09:27

## 2025-02-19 RX ADMIN — ACETAMINOPHEN 975 MG: 325 TABLET, FILM COATED ORAL at 09:31

## 2025-02-19 RX ADMIN — PANTOPRAZOLE SODIUM 40 MG: 40 TABLET, DELAYED RELEASE ORAL at 06:40

## 2025-02-19 RX ADMIN — SENNOSIDES AND DOCUSATE SODIUM 1 TABLET: 50; 8.6 TABLET ORAL at 20:58

## 2025-02-19 RX ADMIN — OXYCODONE HYDROCHLORIDE 5 MG: 5 TABLET ORAL at 20:58

## 2025-02-19 RX ADMIN — ACETAMINOPHEN 975 MG: 325 TABLET, FILM COATED ORAL at 01:39

## 2025-02-19 RX ADMIN — ACETAMINOPHEN 975 MG: 325 TABLET, FILM COATED ORAL at 16:45

## 2025-02-19 RX ADMIN — CEFAZOLIN SODIUM 2 G: 2 SOLUTION INTRAVENOUS at 20:58

## 2025-02-19 RX ADMIN — FUROSEMIDE 40 MG: 40 TABLET ORAL at 09:31

## 2025-02-19 RX ADMIN — POLYETHYLENE GLYCOL 3350 17 G: 17 POWDER, FOR SOLUTION ORAL at 09:31

## 2025-02-19 RX ADMIN — METOPROLOL SUCCINATE 25 MG: 25 TABLET, EXTENDED RELEASE ORAL at 09:31

## 2025-02-19 RX ADMIN — ATORVASTATIN CALCIUM 10 MG: 10 TABLET, FILM COATED ORAL at 09:31

## 2025-02-19 RX ADMIN — CEFAZOLIN SODIUM 2 G: 2 SOLUTION INTRAVENOUS at 03:28

## 2025-02-19 RX ADMIN — CEFAZOLIN SODIUM 2 G: 2 SOLUTION INTRAVENOUS at 12:03

## 2025-02-19 RX ADMIN — SENNOSIDES AND DOCUSATE SODIUM 1 TABLET: 50; 8.6 TABLET ORAL at 09:32

## 2025-02-19 RX ADMIN — OXYCODONE HYDROCHLORIDE 5 MG: 5 TABLET ORAL at 01:39

## 2025-02-19 ASSESSMENT — ACTIVITIES OF DAILY LIVING (ADL)
ADLS_ACUITY_SCORE: 31
ADLS_ACUITY_SCORE: 25
ADLS_ACUITY_SCORE: 26
ADLS_ACUITY_SCORE: 25
ADLS_ACUITY_SCORE: 31
ADLS_ACUITY_SCORE: 26
ADLS_ACUITY_SCORE: 25
ADLS_ACUITY_SCORE: 26
ADLS_ACUITY_SCORE: 26
ADLS_ACUITY_SCORE: 25
ADLS_ACUITY_SCORE: 31
ADLS_ACUITY_SCORE: 25

## 2025-02-19 NOTE — PLAN OF CARE
Goal Outcome Evaluation:  Denies CP, SOB. RN used ipad interpretor and pt requested to have family interpret so RN explained that pt could use the ipad interpretor at anytime, pt verbalized understanding. Back brace is not here yet, bedrest. Dr Wilkinson hospitalist paged at [phone] about pt PTA meds if wanting to resume, no message back yet. Oncoming RN aware.

## 2025-02-19 NOTE — PROGRESS NOTES
"   02/19/25 2955   Appointment Info   Signing Clinician's Name / Credentials (PT) Magdalene Campos, PT, DPT   Rehab Comments (PT) LSO on when OOB; spinal       Present yes   Language Martiniquais, family interpreted   Living Environment   People in Home other (see comments)   Current Living Arrangements apartment   Home Accessibility no concerns   Transportation Anticipated family or friend will provide   Living Environment Comments pt typically lives alone. Family will be staying with her while she's recovering. Pt doesn't drive, family transports her   Self-Care   Usual Activity Tolerance good   Current Activity Tolerance fair   Equipment Currently Used at Home cane, straight   Fall history within last six months yes   Number of times patient has fallen within last six months 3   Activity/Exercise/Self-Care Comment Independent at baseline, per daughter pt is able to take care of herself well at home. Cane for community mobility. Several falls.   General Information   Onset of Illness/Injury or Date of Surgery 02/18/25   Referring Physician Joel Ge MD   Patient/Family Therapy Goals Statement (PT) to go home   Pertinent History of Current Problem (include personal factors and/or comorbidities that impact the POC) POD 1 \"Lumbar 3 to Sacral 1 Bilateral Decompression, Bilateral\" due to Bilateral stenosis of lateral recess of lumbar spine   Existing Precautions/Restrictions fall;spinal;brace worn when out of bed   Weight-Bearing Status - LLE full weight-bearing   Weight-Bearing Status - RLE full weight-bearing   General Observations LSO when OOB   Cognition   Affect/Mental Status (Cognition) WNL   Pain Assessment   Patient Currently in Pain Yes, see Vital Sign flowsheet  (R LE)   Integumentary/Edema   Integumentary/Edema no deficits were identifed   Posture    Posture Protracted shoulders   Range of Motion (ROM)   Range of Motion ROM deficits secondary to pain;ROM deficits secondary to surgical " procedure   Strength (Manual Muscle Testing)   Strength Comments affected by pain/surgery functionally   Bed Mobility   Comment, (Bed Mobility) min assist to sit up   Transfers   Comment, (Transfers) CGA to stand with FWW   Gait/Stairs (Locomotion)   Comment, (Gait/Stairs) min assist with FWW; cuing frequently for strategy   Balance   Balance Comments needs FWW and assist of 1   Clinical Impression   Criteria for Skilled Therapeutic Intervention Yes, treatment indicated   PT Diagnosis (PT) impaired functional mobility   Influenced by the following impairments decreased strength, balance, and activity tolerance   Functional limitations due to impairments bed mobility, transfers, gait   Clinical Presentation (PT Evaluation Complexity) stable   Clinical Presentation Rationale clinical judgement   Clinical Decision Making (Complexity) low complexity   Planned Therapy Interventions (PT) balance training;bed mobility training;gait training;home exercise program;neuromuscular re-education;patient/family education;stair training;strengthening;transfer training   Risk & Benefits of therapy have been explained evaluation/treatment results reviewed;care plan/treatment goals reviewed;risks/benefits reviewed;current/potential barriers reviewed;participants voiced agreement with care plan;participants included;patient   PT Total Evaluation Time   PT Eval, Low Complexity Minutes (40017) 10   Physical Therapy Goals   PT Frequency Daily   PT Predicted Duration/Target Date for Goal Attainment 02/26/25   PT Goals Bed Mobility;Transfers;Gait   PT: Bed Mobility Modified independent   PT: Transfers Modified independent;Sit to/from stand;Bed to/from chair;Assistive device   PT: Gait Modified independent;Assistive device;Greater than 200 feet   Interventions   Interventions Quick Adds Therapeutic Activity;Gait Training   Therapeutic Activity   Therapeutic Activities: dynamic activities to improve functional performance Minutes (17528) 15    Symptoms Noted During/After Treatment Fatigue;Dizziness   Treatment Detail/Skilled Intervention Supine to seated EOB with min assist, cuing for strategy and safety. In seated felt a little dizzy that improved over time. LSO donned, daughter present instructed in how to don the LSO- daughter able to get most of it on just some assist to make the brace a little more snug. Discussed instructions with family for wearing the brace any time she's out of bed. Transfer to standing with FWW with min assist, cuing for hand placement safety. Pivot transfer to recliner chair with min assist and cuing for keeping device with her- pt did not anyway and pushed it aside. Set up comfortably in chair. Discussed plan with other family who entered- plan for various family members to stay with her and help with chores etc.   Gait Training   Gait Training Minutes (06605) 10   Symptoms Noted During/After Treatment (Gait Training) fatigue;dizziness   Treatment Detail/Skilled Intervention Ambulation with FWW where pt needed cuing frequently for walker proximity and posture- family assisted with this cuing as well. Pt demonstrates shortness of breath but insistent to continue ambulating. Slower pace as she fatigued. No overt unsteadiness.   Distance in Feet 220ft   [place] Level (Gait Training) minimum assist (75% patient effort)   Physical Assistance Level (Gait Training) verbal cues   PT Discharge Planning   PT Plan continue family education (re: mobility etc), bring spine handout and review. ambulation with FWW   PT Discharge Recommendation (DC Rec) home with assist;home with home care physical therapy   PT Rationale for DC Rec Anticipated that in a few days patient will be able to demonstrate all functional mobility she will need to d/c home with support of family who plan to stay with her while she's recovering. Pt will benefit from home PT, which patient's family is asking for, to address decreased functional mobility   PT Brief  overview of current status min assist of 1 with FWW  (Goals of therapy will be to address safe mobility and make recs for d/c to next level of care. Pt and RN will continue to follow all falls risk precautions as documented by RN staff while hospitalized.)   PT Total Distance Amb During Session (feet) 225   PT Equipment Needed at Discharge walker, rolling   Physical Therapy Time and Intention   Timed Code Treatment Minutes 25   Total Session Time (sum of timed and untimed services) 35

## 2025-02-19 NOTE — PLAN OF CARE
Goal Outcome Evaluation:    Patient ambulation status: Not oob, awaiting brace  Patient able to stand for X-ray:No  Standing/upright x-ray complete: No  Patient using oral analgesics:yes  Voiding spontaneously:no, Trimble to be removed POD#1  Drains discontinued:no  Incision clean and dry:yes  Bowel status:WNL       BP still elevated overnight. Pt denies chest pain and SOB.

## 2025-02-19 NOTE — PHARMACY-ADMISSION MEDICATION HISTORY
Pharmacist Admission Medication History    Admission medication history is complete. The information provided in this note is only as accurate as the sources available at the time of the update.    Information Source(s): Patient, Family member, Prescription bottles, and CareEverywhere/SureScripts via in-person    Pertinent Information: Per patient brought prescription bottles of medications which she is taking currently. Recent Sure Scripts for amlodipine (which was stopped after her last hospitalization due to edema), ferrous sulfate, hydroxyzine, montelukast, pregabalin which daughter states patient does not take.     Changes made to PTA medication list:  Added: metoprolol, empagliflozin  Deleted: cyanocobalamine   Changed: vitamin D3 2000 --> 10,000 units daily     Allergies reviewed with patient and updates made in EHR: yes    Medication History Completed By: Cherri Mooney RPH 2/18/2025 6:00 PM    PTA Med List   Medication Sig Last Dose/Taking    acetaminophen (TYLENOL) 500 MG tablet Take 1,000 mg by mouth every 6 hours as needed. Taking As Needed    aspirin (ASA) 81 MG EC tablet Take 81 mg by mouth daily. 2/5/2025    atorvastatin (LIPITOR) 10 MG tablet Take 1 tablet (10 mg) by mouth daily 2/16/2025    empagliflozin (JARDIANCE) 10 MG TABS tablet Take 10 mg by mouth daily. 2/16/2025    folic acid (FOLVITE) 1 MG tablet Take 1 tablet by mouth 2/15/2025    furosemide (LASIX) 40 MG tablet Take 40 mg by mouth daily. 2/17/2025    losartan (COZAAR) 50 MG tablet Take 1 tablet (50 mg) by mouth daily 2/15/2025    metoprolol succinate ER (TOPROL XL) 25 MG 24 hr tablet Take 25 mg by mouth daily. 2/16/2025    pantoprazole (PROTONIX) 40 MG EC tablet Take 40 mg by mouth daily. 2/17/2025    vitamin D3 (CHOLECALCIFEROL) 250 mcg (96626 units) capsule Take 1 capsule by mouth daily. 2/15/2025

## 2025-02-19 NOTE — PROGRESS NOTES
S: John is a 80 yof that was seen today at [facility] Room 2409, for a Lumbar Sacral Orthosis (LSO). The patients daughter is present.    Dx: Lumbar laminectomy    O: The patient s waist circumference was measured to be 49 inches. A Trend Pro LSO was set to the patient s waist size and fit to the patient. This brace features a belt that facilitates both compression and spinal support. The patient was fit while supine.    A: I explained to the patient the purpose and function of the brace as well as donning and doffing. I also provided the patient with the written use and care instructions from the .    P: If there is a need to adjust the brace while the patient still in the hospital, the patient s nurse should contact the [facility]. Adjustments made outside of the hospital can be performed, at no charge, at any of the [facility].    G: This orthosis applies compression to the patient s lumbosacral region of the spine. Compression of the patient s soft tissues serves to unload the vertebrae and reduces pressure, reducing pain. The orthosis also increases medial-lateral and anterior-posterior stability of the back/spine. The goal of this orthosis is to reduce pain and increase the comfort while the patient performs their ADLs.    Electronically signed by Osiel Molina, Board Eligible

## 2025-02-19 NOTE — PROGRESS NOTES
"Progress Note    Post-operative Day: 1 Day Post-Op    Procedure(s):  Lumbar 3 to Sacral 1 Bilateral Decompression      Subjective:    Pain moderately controlled. Denies headaches, SOB or chest pain. Was able to sleep overnight. Denies leg pain, reports post-operative back pain. BÁRBARA overnight. Blood pressure coming down but remains elevated.       Objective:  Blood pressure (!) 146/60, pulse 86, temperature 98.9  F (37.2  C), temperature source Oral, resp. rate 20, height 1.575 m (5' 2\"), weight 99.5 kg (219 lb 6.4 oz), SpO2 93%.    Patient Vitals for the past 24 hrs:   BP Temp Temp src Pulse Resp SpO2   02/19/25 0533 (!) 146/60 -- -- -- -- 93 %   02/19/25 0142 (!) 150/61 -- -- 86 20 99 %   02/18/25 2014 (!) 154/61 -- -- -- -- --   02/18/25 1950 126/56 -- -- 75 22 97 %   02/18/25 1844 (!) 150/59 98.9  F (37.2  C) Oral 82 -- 98 %   02/18/25 1803 (!) 152/81 -- -- -- -- 97 %   02/18/25 1731 (!) 172/97 -- -- 84 -- 98 %   02/18/25 1650 (!) 156/108 -- -- 84 22 100 %   02/18/25 1600 (!) 168/85 -- -- 85 23 98 %   02/18/25 1530 (!) 171/92 -- -- 82 21 95 %   02/18/25 1500 (!) 181/89 -- -- 81 24 95 %   02/18/25 1445 -- -- -- 84 25 (!) 91 %   02/18/25 1430 (!) 193/90 -- -- 84 23 98 %   02/18/25 1415 -- -- -- 84 23 99 %   02/18/25 1400 (!) 186/85 -- -- 81 27 97 %   02/18/25 1345 (!) 188/83 -- -- 79 17 97 %   02/18/25 1330 (!) 187/85 -- -- 80 26 98 %   02/18/25 1315 (!) 189/88 -- -- 77 18 95 %   02/18/25 1300 (!) 189/97 -- -- 82 24 94 %   02/18/25 1245 (!) 193/90 -- -- 81 27 96 %   02/18/25 1230 (!) 176/107 -- -- 77 14 92 %   02/18/25 1215 (!) 207/97 -- -- 98 19 (!) 87 %   02/18/25 1214 (!) 207/97 -- -- 97 13 (!) 91 %   02/18/25 1205 (!) 207/98 -- -- 103 -- 97 %       Wt Readings from Last 4 Encounters:   02/18/25 99.5 kg (219 lb 6.4 oz)   05/26/23 96.2 kg (212 lb)   03/17/23 95.7 kg (211 lb)       Motor function, sensation, and circulation intact   Yes  Wound status: incisions are clean dry and intact. Yes  Drain in places " holding suction    Pertinent Labs   Lab Results: personally reviewed.     Recent Labs   Lab Test 03/24/23  1650   *       Plan:   1.Anticoagulation protocol: Ambulation and SCDs  2.Pain medications:  Current regimen   3.Weight bearing status:  WBAT, AAT  4. Lumbar brace OOB only   5.Continue cares and rehabilitation   6. Trimble to be discontinued POD#1  7. Hospital medicine consult for co-management   8. Post-op abx until drain removal  9.Dispo: TBD       Report completed by:  LUISA BECKHAM MD  Date: 2/19/2025  Time: 7:15 AM

## 2025-02-19 NOTE — CONSULTS
[facility]  Consult Note - Hospitalist Service  Date of Admission:  2/18/2025  Consult Requested by: Dr Ge  Reason for Consult: Comanagement of HTN    Assessment & Plan   John Lizama is a 80 year old female with hypertension, chronic HFpEF, moderate AS, PMR, cognitive impairment, BLE edema, GERD, iron deficiency anemia (Hgb 8.5-9.4), LBBB, and hyponatremia admitted on 2/18/2025 for an elective L3-S1 decompression with Dr Ge. Hospitalist consulted for comanagement of hypertension.     S/p L3-S1 decompression, POD#1  Has ongoing BLE radiculopathy, R>L. Currently on oxycodone; per daughter has not tried gabapentin in the past.  - Pre-op Hgb 9.8  - Add Aqua K pad  - Activity, pain control, DVT PPx / restart of antiplatelets, and disposition as per Ortho Spine     Chronic HFpEF, compensated   Hypertension  AM creatinine WNL. /60 with HR 86. Lexiscan 1/28/25 notable for symptoms of dyspnea (chronic) without evidence of ischemia; showed preserved EF. Per Cards note 1/15, patient could stop Toprol and losartan if only on them for CHF. No additional instructions in chart from Cards or PCP regarding stopping these medications.   - Resume PTA Toprol and losartan with hold parameters   - Resume PTA Lasix   - Hold PTA empagliflozin; can resume as outpatient  - Implement fluid restriction (given h/o hyponatremia and risk of SIADH due to pain, opiates)  - PRN Hydralazine added  - Defer to Ortho Spine regarding continuation of ASA 81      GERD  - Resume PTA protonix     Cognitive impairment  - Reorient and redirect as needed     Chronic pain  - Defer to Ortho Spine     The patient's care was discussed with the Attending Physician, Dr. Goff, Patient, and Patient's Family.    Clinically Significant Risk Factors Present on Admission                 # Drug Induced Platelet Defect: home medication list includes an antiplatelet medication   # Hypertension: Home medication list includes  "antihypertensive(s)           # Severe Obesity: Estimated body mass index is 40.13 kg/m  as calculated from the following:    Height as of this encounter: 1.575 m (5' 2\").    Weight as of this encounter: 99.5 kg (219 lb 6.4 oz).              Angie Ortiz PA-C  Hospitalist Service  Securely message with [employer] (more info), please search Angie Ortiz  ______________________________________________________________________    History of Present Illness   John Lizama is a 80 year old female with hypertension, chronic HFpEF, moderate AS, PMR, cognitive impairment, BLE edema, GERD, chronic iron deficiency anemia (Hgb 8.5-9.4), LBBB, and hyponatremia admitted on 2/18/2025 for an elective L3-S1 decompression with Dr Ge. Hospitalist consulted for comanagement of hypertension.     Hospitalized at [facility] Dec 2024 for decompensated CHF and found to have hyponatremia (sodium 118), new iron deficiency anemia (Hgb 8's with iron 11, ferritin 3), and impaired esophageal motility thought to be causing chronic nausea and regurgitation. Has h/o hyponatremia beginning fall of 2024 with Na ranging 124-130. Cortisol studies negative. UA with proteinuria but renal function normal. Diuresed 15# during hospitalization and started on fluid restriction; sodium normalized to 133. SLP eval negative for aspiration. Jardiance started. Referred to [facility] for esophageal studies.    Seen by Cards 1/15/25 for pre-op clearance. Per Cards, would not need Toprol XL (changed by PCP from carvedilol) or losartan (added by PCP) if EF preserved. Lexiscan 1/28/25 notable for symptoms of dyspnea (appears chronic) without evidence of ischemia; showed preserved EF.    Per daughter, patient has remained on both Toprol and losartan. No recent med changes. Patient has ongoing R>L radiculopathy and is having a hard time mobilizing. Is waiting for PT to come by.     Past Medical History    Past Medical History:   Diagnosis Date    Allergic rhinitis     " Anemia     Arthralgia of shoulder     Bilateral leg edema     Chronic diastolic congestive heart failure (H)     Chronic low back pain     Dementia (H)     Gastroesophageal reflux disease without esophagitis     HTN (hypertension)     Hyperlipidemia     Insomnia     Lipoma of buttock     Morbid obesity (H)     Osteoarthritis of left glenohumeral joint     Personal history of DVT (deep vein thrombosis)     Polymyalgia rheumatica     Primary osteoarthritis of both knees     Spinal stenosis of lumbar region     Venous (peripheral) insufficiency     Vitamin D deficiency        Past Surgical History   Past Surgical History:   Procedure Laterality Date    COMPLEX KELMAN PHACOEMULSIFICATION INTRAOCULAR LENS IMPLANT, REMOVAL OF EYELID LESION LEFT EYE Left 06/29/2023    SI JOINT ASPIRATION Right 12/02/2010       Medications   Medications Prior to Admission   Medication Sig Dispense Refill Last Dose/Taking    acetaminophen (TYLENOL) 500 MG tablet Take 1,000 mg by mouth every 6 hours as needed.   Taking As Needed    aspirin (ASA) 81 MG EC tablet Take 81 mg by mouth daily.   2/5/2025    atorvastatin (LIPITOR) 10 MG tablet Take 1 tablet (10 mg) by mouth daily 90 tablet 3 2/16/2025    empagliflozin (JARDIANCE) 10 MG TABS tablet Take 10 mg by mouth daily.   2/16/2025    folic acid (FOLVITE) 1 MG tablet Take 1 tablet by mouth   2/15/2025    furosemide (LASIX) 40 MG tablet Take 40 mg by mouth daily.   2/17/2025    losartan (COZAAR) 50 MG tablet Take 1 tablet (50 mg) by mouth daily 90 tablet 3 2/15/2025    metoprolol succinate ER (TOPROL XL) 25 MG 24 hr tablet Take 25 mg by mouth daily.   2/16/2025    pantoprazole (PROTONIX) 40 MG EC tablet Take 40 mg by mouth daily.   2/17/2025    vitamin D3 (CHOLECALCIFEROL) 250 mcg (77459 units) capsule Take 1 capsule by mouth daily.   2/15/2025          Review of Systems    A comprehensive 14 point review of systems was undertaken with pertinent positives and negatives as above and otherwise  unremarkable.     Social History   I have reviewed this patient's social history and updated it with pertinent information if needed.  Social History     Tobacco Use    Smoking status: Never     Passive exposure: Never    Smokeless tobacco: Never   Substance Use Topics    Alcohol use: Never    Drug use: Never     Lives at home with family. Unable to climb a flight of stairs due to pain.    Family History       Noncontributory      Allergies   No Known Allergies     Physical Exam   Vital Signs: Temp: 98.9  F (37.2  C) Temp src: Oral BP: (!) 146/60 Pulse: 86   Resp: 20 SpO2: 93 % O2 Device: None (Room air) Oxygen Delivery: 2 LPM  Weight: 219 lbs 6.4 oz    GENERAL:  Pleasant, cooperative, alert. Daughter acting as . Patient gesturing to pain in right hip and down leg. Appears mildly uncomfortable but not in acute distress. Nontoxic.   HEENT: Normocephalic, atraumatic.  Extra occular mm grossly intact.  Sclera clear. PERRL.  Mucous membranes moist.     PULMONOLOGY: Clear to auscultation bilaterally    CARDIAC: Regular rate and rhythm.  +FRANCIA.  Normal S1/S2.  No S3/S4.  ABDOMEN: Soft, nontender non distended.   MUSCULOSKELETAL:  Moving x 4 spontaneously with CMS intact x4.  Wearing TLSO  NEURO: Alert and oriented to self and daughter.  CN II-XII grossly intact and symmetric.  No tremor.        Medical Decision Making       45 MINUTES SPENT BY ME on the date of service doing chart review, history, exam, documentation & further activities per the note.      Data     I have personally reviewed the following data over the past 24 hrs:    N/A  \   N/A   / N/A     N/A N/A N/A /  N/A   N/A N/A 0.85 \     Procal: N/A CRP: N/A Lactic Acid: 1.9         Imaging results reviewed over the past 24 hrs:   Recent Results (from the past 24 hours)   XR Surgery FRANKLYN L/T 5 Min Fluoro w Stills    Narrative    This exam was marked as non-reportable because it will not be read by a   radiologist or a [place] non-radiologist  provider.

## 2025-02-19 NOTE — PROGRESS NOTES
"NUTRITION BRIEF NOTE    REASON FOR NUTRITION CONSULT:  Malnutrition screening tool (MST) total score of \"2\" with \"yes\" answered to the question of \"have you recently lost weight without trying?\".      ASSESSMENT:  Per review of wt trending available pt wt appears to have fluctuated up/down and may be stable.   Wt Readings from Last 10 Encounters:   02/18/25 99.5 kg (219 lb 6.4 oz)   05/26/23 96.2 kg (212 lb)   03/17/23 95.7 kg (211 lb)     Care everywhere:   12/22/24 : 100.5 kg (221 lb 8 oz) (12/22/24 0959)   11/11/24 : 233 lb 3.2 oz (105.8 kg).   11/12/24 : 105.8 kg (233 lb 3.2 oz)   08/18/24 : 96.9 kg (213 lb 11.2 oz)   05/26/23 : 96.2 kg (212 lb)     - Please formally consult sould nutrition needs arise.    FOLLOW UP:   This brings total MST score down to \"0\".  There's No active pressure wounds documented. Will therefore chart check at LOS unless formally consulted in the interim.    "

## 2025-02-20 ENCOUNTER — APPOINTMENT (OUTPATIENT)
Dept: PHYSICAL THERAPY | Facility: CLINIC | Age: 80
DRG: 516 | End: 2025-02-20
Attending: STUDENT IN AN ORGANIZED HEALTH CARE EDUCATION/TRAINING PROGRAM
Payer: COMMERCIAL

## 2025-02-20 VITALS
OXYGEN SATURATION: 91 % | SYSTOLIC BLOOD PRESSURE: 144 MMHG | RESPIRATION RATE: 20 BRPM | BODY MASS INDEX: 40.37 KG/M2 | HEIGHT: 62 IN | TEMPERATURE: 98.4 F | HEART RATE: 69 BPM | WEIGHT: 219.4 LBS | DIASTOLIC BLOOD PRESSURE: 73 MMHG

## 2025-02-20 LAB
ANION GAP SERPL CALCULATED.3IONS-SCNC: 12 MMOL/L (ref 7–15)
BUN SERPL-MCNC: 10.4 MG/DL (ref 8–23)
CALCIUM SERPL-MCNC: 9.2 MG/DL (ref 8.8–10.4)
CHLORIDE SERPL-SCNC: 99 MMOL/L (ref 98–107)
CREAT SERPL-MCNC: 0.71 MG/DL (ref 0.51–0.95)
EGFRCR SERPLBLD CKD-EPI 2021: 85 ML/MIN/1.73M2
GLUCOSE SERPL-MCNC: 115 MG/DL (ref 70–99)
HCO3 SERPL-SCNC: 26 MMOL/L (ref 22–29)
HGB BLD-MCNC: 8.9 G/DL (ref 11.7–15.7)
POTASSIUM SERPL-SCNC: 3.6 MMOL/L (ref 3.4–5.3)
SODIUM SERPL-SCNC: 137 MMOL/L (ref 135–145)

## 2025-02-20 PROCEDURE — 85018 HEMOGLOBIN: CPT | Performed by: PHYSICIAN ASSISTANT

## 2025-02-20 PROCEDURE — 120N000001 HC R&B MED SURG/OB

## 2025-02-20 PROCEDURE — 97116 GAIT TRAINING THERAPY: CPT | Mod: GP | Performed by: PHYSICAL THERAPY ASSISTANT

## 2025-02-20 PROCEDURE — 250N000013 HC RX MED GY IP 250 OP 250 PS 637: Performed by: STUDENT IN AN ORGANIZED HEALTH CARE EDUCATION/TRAINING PROGRAM

## 2025-02-20 PROCEDURE — 99232 SBSQ HOSP IP/OBS MODERATE 35: CPT | Performed by: HOSPITALIST

## 2025-02-20 PROCEDURE — 97530 THERAPEUTIC ACTIVITIES: CPT | Mod: GP | Performed by: PHYSICAL THERAPY ASSISTANT

## 2025-02-20 PROCEDURE — 80048 BASIC METABOLIC PNL TOTAL CA: CPT | Performed by: PHYSICIAN ASSISTANT

## 2025-02-20 PROCEDURE — 250N000013 HC RX MED GY IP 250 OP 250 PS 637: Performed by: PHYSICIAN ASSISTANT

## 2025-02-20 PROCEDURE — 36415 COLL VENOUS BLD VENIPUNCTURE: CPT | Performed by: PHYSICIAN ASSISTANT

## 2025-02-20 PROCEDURE — 250N000011 HC RX IP 250 OP 636: Performed by: STUDENT IN AN ORGANIZED HEALTH CARE EDUCATION/TRAINING PROGRAM

## 2025-02-20 RX ORDER — SALIVA STIMULANT COMB. NO.3
2 SPRAY, NON-AEROSOL (ML) MUCOUS MEMBRANE 4 TIMES DAILY PRN
Status: DISCONTINUED | OUTPATIENT
Start: 2025-02-20 | End: 2025-02-23 | Stop reason: HOSPADM

## 2025-02-20 RX ORDER — DEXAMETHASONE SODIUM PHOSPHATE 4 MG/ML
4 INJECTION, SOLUTION INTRA-ARTICULAR; INTRALESIONAL; INTRAMUSCULAR; INTRAVENOUS; SOFT TISSUE EVERY 6 HOURS
Status: DISCONTINUED | OUTPATIENT
Start: 2025-02-20 | End: 2025-02-22

## 2025-02-20 RX ADMIN — ATORVASTATIN CALCIUM 10 MG: 10 TABLET, FILM COATED ORAL at 09:39

## 2025-02-20 RX ADMIN — POLYETHYLENE GLYCOL 3350 17 G: 17 POWDER, FOR SOLUTION ORAL at 09:39

## 2025-02-20 RX ADMIN — METOPROLOL SUCCINATE 25 MG: 25 TABLET, EXTENDED RELEASE ORAL at 09:39

## 2025-02-20 RX ADMIN — DEXAMETHASONE SODIUM PHOSPHATE 4 MG: 4 INJECTION, SOLUTION INTRAMUSCULAR; INTRAVENOUS at 12:01

## 2025-02-20 RX ADMIN — DEXAMETHASONE SODIUM PHOSPHATE 4 MG: 4 INJECTION, SOLUTION INTRAMUSCULAR; INTRAVENOUS at 22:00

## 2025-02-20 RX ADMIN — FUROSEMIDE 40 MG: 40 TABLET ORAL at 09:39

## 2025-02-20 RX ADMIN — METHOCARBAMOL 250 MG: 500 TABLET ORAL at 12:01

## 2025-02-20 RX ADMIN — CEFAZOLIN SODIUM 2 G: 2 SOLUTION INTRAVENOUS at 03:01

## 2025-02-20 RX ADMIN — ACETAMINOPHEN 975 MG: 325 TABLET, FILM COATED ORAL at 17:06

## 2025-02-20 RX ADMIN — OXYCODONE HYDROCHLORIDE 2.5 MG: 5 TABLET ORAL at 11:14

## 2025-02-20 RX ADMIN — LOSARTAN POTASSIUM 50 MG: 50 TABLET, FILM COATED ORAL at 09:39

## 2025-02-20 RX ADMIN — SENNOSIDES AND DOCUSATE SODIUM 1 TABLET: 50; 8.6 TABLET ORAL at 09:39

## 2025-02-20 RX ADMIN — OXYCODONE HYDROCHLORIDE 5 MG: 5 TABLET ORAL at 02:36

## 2025-02-20 RX ADMIN — CEFAZOLIN SODIUM 2 G: 2 SOLUTION INTRAVENOUS at 11:16

## 2025-02-20 RX ADMIN — ACETAMINOPHEN 975 MG: 325 TABLET, FILM COATED ORAL at 02:43

## 2025-02-20 RX ADMIN — PANTOPRAZOLE SODIUM 40 MG: 40 TABLET, DELAYED RELEASE ORAL at 07:00

## 2025-02-20 RX ADMIN — METHOCARBAMOL 250 MG: 500 TABLET ORAL at 02:58

## 2025-02-20 RX ADMIN — ACETAMINOPHEN 975 MG: 325 TABLET, FILM COATED ORAL at 09:39

## 2025-02-20 RX ADMIN — DEXAMETHASONE SODIUM PHOSPHATE 4 MG: 4 INJECTION, SOLUTION INTRAMUSCULAR; INTRAVENOUS at 17:07

## 2025-02-20 ASSESSMENT — ACTIVITIES OF DAILY LIVING (ADL)
ADLS_ACUITY_SCORE: 35
ADLS_ACUITY_SCORE: 31
ADLS_ACUITY_SCORE: 35
ADLS_ACUITY_SCORE: 31
ADLS_ACUITY_SCORE: 35
ADLS_ACUITY_SCORE: 32
ADLS_ACUITY_SCORE: 31
ADLS_ACUITY_SCORE: 32
ADLS_ACUITY_SCORE: 32
ADLS_ACUITY_SCORE: 31
ADLS_ACUITY_SCORE: 35
ADLS_ACUITY_SCORE: 31
ADLS_ACUITY_SCORE: 31
ADLS_ACUITY_SCORE: 35
ADLS_ACUITY_SCORE: 31
ADLS_ACUITY_SCORE: 31
ADLS_ACUITY_SCORE: 32
ADLS_ACUITY_SCORE: 31
ADLS_ACUITY_SCORE: 35
ADLS_ACUITY_SCORE: 32
DEPENDENT_IADLS:: CLEANING;COOKING;LAUNDRY;SHOPPING;MEAL PREPARATION;MEDICATION MANAGEMENT;TRANSPORTATION;MONEY MANAGEMENT
ADLS_ACUITY_SCORE: 31
ADLS_ACUITY_SCORE: 32
ADLS_ACUITY_SCORE: 32

## 2025-02-20 NOTE — PLAN OF CARE
Goal Outcome Evaluation    Date & Time: 0700 - 1900 2/19/25   Surgery/POD# 1 Lumbar 3 to 1 Sacral Decompression  Behavior & Aggression: Green   Fall Risk: Yes  Orientation: A&O x4  ABNL VS/O2: VSS on RA, Hx of HTN  Pain Management: Scheduled tylenol   Bowel/Bladder: Trimble catheter removed, due to void, no BM this shift   Drains: Accordion Macedonian drain C/D/I bulb to suction, L PIV SL   Wounds/incisions: 2x Lower back incision C/D/I and accordion Macedonian drain incision   Diet: Regular w/ 1,800 fluid restriction   Activity Level: Ax1 w/ gait belt & walker, Brace when out of bed per OOB   Other: Author spoke to patient in [place] and uses family as a her interpretor.

## 2025-02-20 NOTE — CARE PLAN
Shift Summary [id number]    Admitting Diagnosis: Bilateral stenosis of lateral recess of lumbar spine [M48.061]  Foraminal stenosis of lumbosacral region [M48.07]  Inflammation of lumbosacral plexus [M54.17]  Lumbar radiculopathy, acute [M54.16]  Lumbar stenosis [M48.061]   Vitals.Stable   Pain 5/10. Taking  PRN. Last dose . 2.5mg oxycodone and robaxin   A&Ox 4  Voiding . Bathroom  1 assist gbw   Mobility. 1 assist gbw   Tele . No   CMS. Intact ,  Baseline  numbness BLE    Lung Sounds  on clear via RA  GI. WDL  Dressing. Back incision site  CDI . J P drain     Orders Placed This Encounter      Regular Diet Adult       Plan: Pt DG drain was removed by Provider. She was up in chair for meal . She is on intermittent Abx. PIVSL

## 2025-02-20 NOTE — PLAN OF CARE
Goal Outcome Evaluation:      Goal Outcome Evaluation:     Patient ambulation status: Ax1 GB/W + brace  Patient able to stand for X-ray:Yes  Standing/upright x-ray complete: NA  Patient using oral analgesics:yes  Voiding spontaneously: yes  Drains discontinued:no  Incision clean and dry:yes  Bowel status:WNL      Pt denies chest pain and SOB. Increased pain over night PRN oral medications given.

## 2025-02-20 NOTE — PROGRESS NOTES
[facility]    Medicine Progress Note - Hospitalist Service    Date of Admission:  2/18/2025    Assessment & Plan   John Lizama is a 80 year old female with hypertension, chronic HFpEF, moderate AS, PMR, cognitive impairment, BLE edema, GERD, iron deficiency anemia (Hgb 8.5-9.4), LBBB, and hyponatremia admitted on 2/18/2025 for an elective L3-S1 decompression with Dr Ge. Hospitalist consulted for comanagement of hypertension.     L3-S1 severe central stenosis  Lumbar radiculopathy  Neurogenic claudication  S/p L3-S1 decompression on 2/18/25 by Dr. Ge  - Admitted to inpatient.  - Post-op management (activity, pain control, DVT PPx / restart of antiplatelets, disposition, etc) as directed by Orthopedic Surgery.  - IV decadron added on 2/20/25 by Orthopedic Surgery.  - Aqua K pad available.  - Post-op hemoglobin stable at 8.9 on 2/20/25.  - PT and care transitions consulted.    Chronic HFpEF, compensated  Hypertension  AM creatinine WNL. /60 with HR 86. Lexiscan 1/28/25 notable for symptoms of dyspnea (chronic) without evidence of ischemia; showed preserved EF. Per Cards note 1/15, patient could stop Toprol and losartan if only on them for CHF. No additional instructions in chart from Cards or PCP regarding stopping these medications.   - Continue PTA Toprol and losartan with hold parameters.  - Continue PTA Lasix.  - Hold PTA empagliflozin; can resume as outpatient.  - Fluid restriction of 1,800 mL/day (given h/o hyponatremia and risk of SIADH due to pain, opiates).  - PRN Hydralazine available.  - Defer to Ortho Spine regarding when to restart PTA ASA 81 daily.    GERD  - Continue PTA protonix.    Cognitive impairment  - Reorient and redirect as needed.    Chronic pain  - Defer to Ortho Spine.          Diet: Regular Diet Adult  Fluid restriction 1800 ML FLUID    DVT Prophylaxis: Defer to primary service  Trimble Catheter: Not present  Lines: None     Cardiac Monitoring: None  Code  "Status: No CPR- Do NOT Intubate      Clinically Significant Risk Factors                              # Severe Obesity: Estimated body mass index is 40.13 kg/m  as calculated from the following:    Height as of this encounter: 1.575 m (5' 2\").    Weight as of this encounter: 99.5 kg (219 lb 6.4 oz)., PRESENT ON ADMISSION            Social Drivers of Health            Disposition Plan     Medically Ready for Discharge: Anticipated in 2-4 Days             Martin Goff MD  Hospitalist Service  [facility]  Securely message with [employer] (more info)  Text page via [facility] Paging/Directory   ______________________________________________________________________    Interval History   John Lizama was seen this morning.  Family was in the room with her and interpreted for her, they declined a formal .  She did not sleep well last night due to uncontrolled pain.  Continues to have increased pain in her low back and into her legs.  Denies fevers, chest pain, shortness of breath.  Plan of care discussed with bedside nurse.    Physical Exam   Vital Signs: Temp: 98.4  F (36.9  C) Temp src: Oral BP: (!) 159/93 Pulse: 88   Resp: 20 SpO2: 92 % O2 Device: None (Room air)    Weight: 219 lbs 6.4 oz    Constitutional: awake, alert, cooperative, no apparent distress, sitting up in a chair with her legs elevated  Respiratory: no increased work of breathing, coarse lung sounds at the bases  Cardiovascular: regular rate and rhythm, normal S1 and S2, systolic murmur noted  GI: normal bowel sounds, soft, non-distended, non-tender  Skin: warm, dry  Musculoskeletal: no lower extremity pitting edema present  Neurologic: awake, alert, answers questions appropriately with family interpreting, moves all extremities    Medical Decision Making       40 MINUTES SPENT BY ME on the date of service doing chart review, history, exam, documentation & further activities per the note.      Data     I have personally " reviewed the following data over the past 24 hrs:    N/A  \   8.9 (L)   / N/A     137 99 10.4 /  115 (H)   3.6 26 0.71 \

## 2025-02-20 NOTE — CONSULTS
Patient underwent CT CTV to rule out cerebral vein thrombosis in the setting of her starting birth control couple weeks ago.  This was negative for CVT no acute abnormality.  She felt improved after Compazine Toradol although she has some mild akathisia's.  She was given Benadryl IV.  At this point I spoke with the mother and the patient she feels improved from headache standpoint.  I suggest that she stop taking the birth control follow-up with her primary care to see if there was another agent that they could start her on.  She has no meningismus neurologically awake alert do not feel she needs LP at this time.  Will instruct the patient to follow-up with primary care return for any worsening symptoms     Jayesh Ray MD  08/08/24 1375     Care Management Initial Consult    General Information  Assessment completed with: Patient, Family, Grandchild  Type of CM/SW Visit: Initial Assessment    Primary Care Provider verified and updated as needed: Yes   Readmission within the last 30 days: no previous admission in last 30 days      Reason for Consult: discharge planning  Advance Care Planning: Advance Care Planning Reviewed: present on chart          Communication Assessment  Patient's communication style: spoken language (non-English)    Hearing Difficulty or Deaf: no   Wear Glasses or Blind: no    Cognitive  Cognitive/Neuro/Behavioral: WDL                      Living Environment:   People in home: alone     Current living Arrangements: apartment      Able to return to prior arrangements: no       Family/Social Support:  Care provided by: child(miguel), other (see comments) (family)  Provides care for: no one, unable/limited ability to care for self  Marital Status:   Support system: Children, Other (specify) (Grandchildren)          Description of Support System: Supportive    Support Assessment: Adequate family and caregiver support    Current Resources:   Patient receiving home care services: No        Community Resources: None  Equipment currently used at home: cane, straight  Supplies currently used at home: None    Employment/Financial:  Employment Status: retired        Financial Concerns: none   Referral to Financial Worker: No       Does the patient's insurance plan have a 3 day qualifying hospital stay waiver?  No    Lifestyle & Psychosocial Needs:  Social Drivers of Health     Food Insecurity: No Food Insecurity (12/14/2024)    [facility]    Food Insecurity     Do you worry your food will run out before you are able to buy more?: 1   Depression: Not at risk (12/6/2024)    Received from [facility]    PHQ-2     PHQ-2 Score: 0   Housing Stability: Low Risk  (12/14/2024)    Received from  [facility]    Housing Stability     What is your housing situation today?: 1   Tobacco Use: Low Risk  (2/18/2025)    Patient History     Smoking Tobacco Use: Never     Smokeless Tobacco Use: Never     Passive Exposure: Never   Financial Resource Strain: Low Risk  (8/19/2024)    Received from [facility] [employer] [facility]    Financial Resource Strain     Difficulty of Paying Living Expenses: 3     Difficulty of Paying Living Expenses: Not on file   Alcohol Use: Not on file   Transportation Needs: No Transportation Needs (12/14/2024)    Received from [facility] [employer] [facility]    Transportation Needs     Does lack of transportation keep you from medical appointments?: 1     Does lack of transportation keep you from work, meetings or getting things that you need?: 1   Physical Activity: Not on file   Interpersonal Safety: Low Risk  (2/18/2025)    Interpersonal Safety     Do you feel physically and emotionally safe where you currently live?: Yes     Within the past 12 months, have you been hit, slapped, kicked or otherwise physically hurt by someone?: No     Within the past 12 months, have you been humiliated or emotionally abused in other ways by your partner or ex-partner?: No   Stress: Not on file   Social Connections: Socially Integrated (12/14/2024)    Received from [facility] [employer] [facility]    Social Connections     Do you often feel lonely or isolated from those around you?: 0   Health Literacy: Not on file       Functional Status:  Prior to admission patient needed assistance:   Dependent ADLs:: Ambulation-cane  Dependent IADLs:: Cleaning, Cooking, Laundry, Shopping, Meal Preparation, Medication Management, Transportation, Money Management       Mental Health Status:          Chemical Dependency Status:                Values/Beliefs:  Spiritual, Cultural Beliefs, Mandaeism Practices, Values that affect care: yes  Description of  "Beliefs that Will Affect Care: Episcopalian            Discussed  Partnership in Safe Discharge Planning  document with patient/family: No    Additional Information:  Per CM consult for discharge planning, chart was reviewed. Patient is a 80 year old female who presented to [facility] for a \"Lumbar 3 to Sacral 1 Bilateral Decompression\" H&P reviewed, patient has history of \"hypertension, chronic HFpEF, moderate AS, PMR, cognitive impairment, BLE edema, GERD, iron deficiency anemia (Hgb 8.5-9.4), LBBB, and hyponatremia\"     Writer met with patient and granddaughter at bedside. Patients granddaughter provided translation. Patient is agreeable to transitional care and family would like referrals sent in either the [place] area or the [place] area. Granddaughter stated that family lives in the [facility] but they like the quality of care they have received in the [place] area. Family requested to start in [place] but can expand to [place] as they really just want quality of care.     Writer sent referrals to [facility], [facility], [facility], [facility], [facility], and [facility].     Next Steps: accepting facility, PAS, medical clearance    [facility]  INPATIENT CARE COORDINATION      " Monocytes % 6.3 %    Eosinophils % 1.1 %    Basophils % 0.1 %    Neutrophils Absolute 2.8 1.7 - 7.7 K/uL    Lymphocytes Absolute 2.6 1.0 - 5.1 K/uL    Monocytes Absolute 0.4 0.0 - 1.3 K/uL    Eosinophils Absolute 0.1 0.0 - 0.6 K/uL    Basophils Absolute 0.0 0.0 - 0.2 K/uL   Basic Metabolic Panel   Result Value Ref Range    Sodium 138 136 - 145 mmol/L    Potassium 3.7 3.5 - 5.1 mmol/L    Chloride 104 99 - 110 mmol/L    CO2 21 21 - 32 mmol/L    Anion Gap 13 3 - 16    Glucose 95 70 - 99 mg/dL    BUN 13 7 - 20 mg/dL    Creatinine 0.7 0.6 - 1.1 mg/dL    Est, Glom Filt Rate >90 >60    Calcium 9.3 8.3 - 10.6 mg/dL   Magnesium   Result Value Ref Range    Magnesium 1.90 1.80 - 2.40 mg/dL   Phosphorus   Result Value Ref Range    Phosphorus 3.5 2.5 - 4.9 mg/dL   HCG Qualitative, Serum   Result Value Ref Range    Preg, Serum Negative Detects HCG level >10 MIU/mL       ED BEDSIDE ULTRASOUND:  No results found.    MOST RECENT VITALS:  BP: 110/65,Temp: 98.2 °F (36.8 °C), Pulse: 70, Resp: 15, SpO2: 97 %       ED Course     Nursing Notes, Past Medical Hx, Past Surgical Hx, Social Hx,Allergies, and Family Hx were reviewed.         The patient was given the following medications:  Orders Placed This Encounter   Medications    lactated ringers IV soln infusion    prochlorperazine (COMPAZINE) injection 10 mg    ketorolac (TORADOL) injection 15 mg    iopamidol (ISOVUE-370) 76 % injection 75 mL       CONSULTS:  None    Review of Systems     As documented in the HPI, otherwise all other systems were reviewed and were negative.    Past Medical, Surgical, Family, and Social History     She has no past medical history on file.  She has no past surgical history on file.  Her family history is not on file.  She     Medications     Previous Medications    No medications on file       Allergies     She has No Known Allergies.    Physical Exam     INITIAL VITALS: BP: 110/65, Temp: 98.2 °F (36.8 °C), Pulse: 70, Resp: 15, SpO2: 97 %   General:

## 2025-02-20 NOTE — PROGRESS NOTES
"Progress Note    Post-operative Day: 1 Day Post-Op    Procedure(s):  Lumbar 3 to Sacral 1 Bilateral Decompression      Subjective:  Reports increased pain overnight. Has been mostly taking tylenol no Oxy this morning. Is up in a chair. Reports no headaches or SOB/chest pain. Reports muscular discomfort.     Objective:  Blood pressure (!) 159/93, pulse 88, temperature 98.4  F (36.9  C), temperature source Oral, resp. rate 20, height 1.575 m (5' 2\"), weight 99.5 kg (219 lb 6.4 oz), SpO2 92%.    Patient Vitals for the past 24 hrs:   BP Temp Temp src Pulse Resp SpO2   02/20/25 0939 (!) 159/93 -- -- -- -- --   02/20/25 0733 139/59 98.4  F (36.9  C) Oral 88 20 92 %   02/20/25 0247 134/76 97.8  F (36.6  C) Oral 75 20 95 %   02/19/25 1612 116/50 -- -- 68 -- --   02/19/25 1606 95/51 98.1  F (36.7  C) Oral 61 -- 98 %       Wt Readings from Last 4 Encounters:   02/18/25 99.5 kg (219 lb 6.4 oz)   05/26/23 96.2 kg (212 lb)   03/17/23 95.7 kg (211 lb)       Motor function, sensation, and circulation intact   Yes  Wound status: incisions are clean dry and intact. Yes  Drain in places holding suction  25cc in 24 hrs    Pertinent Labs   Lab Results: personally reviewed.     Recent Labs   Lab Test 03/24/23  1650   *       Plan:   1.Anticoagulation protocol: Ambulation and SCDs  2.Pain medications:  Current regimen   3.Weight bearing status:  WBAT, AAT  4. Lumbar brace OOB only, does not need when in a chair.   5.Continue cares and rehabilitation   6. Hospital medicine consult for co-management   7. Drain discontinued  8. PT, encourage mobilization  9.  Decadron drip   10. Incentive spirometer         Report completed by:  LUISA BECKHAM MD  Date: 2/19/2025  Time: 7:15 AM    "

## 2025-02-21 ENCOUNTER — APPOINTMENT (OUTPATIENT)
Dept: PHYSICAL THERAPY | Facility: CLINIC | Age: 80
DRG: 516 | End: 2025-02-21
Attending: STUDENT IN AN ORGANIZED HEALTH CARE EDUCATION/TRAINING PROGRAM
Payer: COMMERCIAL

## 2025-02-21 PROCEDURE — 250N000013 HC RX MED GY IP 250 OP 250 PS 637: Performed by: HOSPITALIST

## 2025-02-21 PROCEDURE — 99232 SBSQ HOSP IP/OBS MODERATE 35: CPT | Performed by: HOSPITALIST

## 2025-02-21 PROCEDURE — 120N000001 HC R&B MED SURG/OB

## 2025-02-21 PROCEDURE — 250N000011 HC RX IP 250 OP 636: Performed by: STUDENT IN AN ORGANIZED HEALTH CARE EDUCATION/TRAINING PROGRAM

## 2025-02-21 PROCEDURE — 97116 GAIT TRAINING THERAPY: CPT | Mod: GP | Performed by: PHYSICAL THERAPY ASSISTANT

## 2025-02-21 PROCEDURE — 250N000013 HC RX MED GY IP 250 OP 250 PS 637: Performed by: STUDENT IN AN ORGANIZED HEALTH CARE EDUCATION/TRAINING PROGRAM

## 2025-02-21 PROCEDURE — 250N000013 HC RX MED GY IP 250 OP 250 PS 637: Performed by: PHYSICIAN ASSISTANT

## 2025-02-21 PROCEDURE — 97530 THERAPEUTIC ACTIVITIES: CPT | Mod: GP | Performed by: PHYSICAL THERAPY ASSISTANT

## 2025-02-21 RX ORDER — HYDROXYZINE HYDROCHLORIDE 10 MG/1
10 TABLET, FILM COATED ORAL 3 TIMES DAILY PRN
Status: DISCONTINUED | OUTPATIENT
Start: 2025-02-21 | End: 2025-02-23 | Stop reason: HOSPADM

## 2025-02-21 RX ADMIN — DEXAMETHASONE SODIUM PHOSPHATE 4 MG: 4 INJECTION, SOLUTION INTRAMUSCULAR; INTRAVENOUS at 11:08

## 2025-02-21 RX ADMIN — ACETAMINOPHEN 975 MG: 325 TABLET, FILM COATED ORAL at 04:53

## 2025-02-21 RX ADMIN — METOPROLOL SUCCINATE 25 MG: 25 TABLET, EXTENDED RELEASE ORAL at 08:31

## 2025-02-21 RX ADMIN — SENNOSIDES AND DOCUSATE SODIUM 1 TABLET: 50; 8.6 TABLET ORAL at 21:42

## 2025-02-21 RX ADMIN — METHOCARBAMOL 250 MG: 500 TABLET ORAL at 21:49

## 2025-02-21 RX ADMIN — LOSARTAN POTASSIUM 50 MG: 50 TABLET, FILM COATED ORAL at 08:32

## 2025-02-21 RX ADMIN — DEXAMETHASONE SODIUM PHOSPHATE 4 MG: 4 INJECTION, SOLUTION INTRAMUSCULAR; INTRAVENOUS at 17:18

## 2025-02-21 RX ADMIN — ACETAMINOPHEN 975 MG: 325 TABLET, FILM COATED ORAL at 12:08

## 2025-02-21 RX ADMIN — POLYETHYLENE GLYCOL 3350 17 G: 17 POWDER, FOR SOLUTION ORAL at 08:35

## 2025-02-21 RX ADMIN — HYDROXYZINE HYDROCHLORIDE 10 MG: 10 TABLET, FILM COATED ORAL at 11:07

## 2025-02-21 RX ADMIN — SENNOSIDES AND DOCUSATE SODIUM 1 TABLET: 50; 8.6 TABLET ORAL at 08:31

## 2025-02-21 RX ADMIN — FUROSEMIDE 40 MG: 40 TABLET ORAL at 08:32

## 2025-02-21 RX ADMIN — METHOCARBAMOL 250 MG: 500 TABLET ORAL at 08:40

## 2025-02-21 RX ADMIN — DEXAMETHASONE SODIUM PHOSPHATE 4 MG: 4 INJECTION, SOLUTION INTRAMUSCULAR; INTRAVENOUS at 04:45

## 2025-02-21 RX ADMIN — DEXAMETHASONE SODIUM PHOSPHATE 4 MG: 4 INJECTION, SOLUTION INTRAMUSCULAR; INTRAVENOUS at 22:50

## 2025-02-21 RX ADMIN — PANTOPRAZOLE SODIUM 40 MG: 40 TABLET, DELAYED RELEASE ORAL at 06:55

## 2025-02-21 RX ADMIN — ATORVASTATIN CALCIUM 10 MG: 10 TABLET, FILM COATED ORAL at 08:32

## 2025-02-21 RX ADMIN — HYDROXYZINE HYDROCHLORIDE 10 MG: 10 TABLET, FILM COATED ORAL at 22:50

## 2025-02-21 RX ADMIN — ACETAMINOPHEN 975 MG: 325 TABLET, FILM COATED ORAL at 21:43

## 2025-02-21 NOTE — PROGRESS NOTES
"Progress Note    Post-operative Day: 3 Day Post-Op    Procedure(s):  Lumbar 3 to Sacral 1 Bilateral Decompression      Subjective:  Reports that her pain was well controlled last night. Did not require pain medications. States that this morning her pain has increased in the back due to how she was sleeping, reports some thigh discomfort.   Asking for a hospital bed for home. Reports improvements in preop symptoms.     Reports cough is improving. BP is stable    Objective:  Blood pressure (!) 164/66, pulse 83, temperature 98.2  F (36.8  C), temperature source Oral, resp. rate 28, height 1.575 m (5' 2\"), weight 99.5 kg (219 lb 6.4 oz), SpO2 96%.    Patient Vitals for the past 24 hrs:   BP Temp Temp src Pulse Resp SpO2   02/21/25 0453 (!) 164/66 98.2  F (36.8  C) Oral 83 28 96 %   02/20/25 1739 (!) 144/73 -- -- 69 -- --   02/20/25 1533 (!) 147/66 98.4  F (36.9  C) Oral 73 20 (!) 91 %   02/20/25 0939 (!) 159/93 -- -- -- -- --   02/20/25 0733 139/59 98.4  F (36.9  C) Oral 88 20 92 %       Wt Readings from Last 4 Encounters:   02/18/25 99.5 kg (219 lb 6.4 oz)   05/26/23 96.2 kg (212 lb)   03/17/23 95.7 kg (211 lb)     Motor function, sensation, and circulation intact   Yes  Wound status: incisions are clean dry and intact. Yes  New Aquacel dressing placed.    Pertinent Labs   Lab Results: personally reviewed.     Recent Labs   Lab Test 03/24/23  1650   *     Plan:   1.Anticoagulation protocol: Ambulation and SCDs  2.Pain medications:  Current regimen, muscle relaxer PRN  3.Weight bearing status:  WBAT, AAT  4. Lumbar brace OOB only, does not need when in a chair.   5.Continue cares and rehabilitation   6. Hospital medicine consult for co-management   7. Drain discontinued  8. PT, encourage mobilization  9.  Decadron drip will discontinue this weekend.  10. Incentive spirometer  11. Dispo: TCU  12. Can resume ASA on discharge       Report completed by:  LUISA BECKHAM MD  Date: 2/19/2025  Time: 7:15 AM    "

## 2025-02-21 NOTE — PROGRESS NOTES
[facility]     Patient is anticipated to discharge 2/22/25.  Patient agrees to have [facility] provide PT OT services. Patient will receive a phone call from [facility] to schedule an initial home care visit post discharge from the hospital. Anticipated start of care date is [within 24-48 hours of discharge].     Please note: SOC date is based on availability of the day referral was received. If patients discharge date changes, please reach out to Clinical Liaison or the HUB to ensure SOC date is still appropriate for a safe discharge plan.     Thank you for the referral.     CANDI Johnson, LPN  [facility] Liaison   Cell: [phone]

## 2025-02-21 NOTE — PROGRESS NOTES
[facility]    Medicine Progress Note - Hospitalist Service    Date of Admission:  2/18/2025    Assessment & Plan   John Lizama is a 80 year old female with hypertension, chronic HFpEF, moderate AS, PMR, cognitive impairment, BLE edema, GERD, iron deficiency anemia (Hgb 8.5-9.4), LBBB, and hyponatremia admitted on 2/18/2025 for an elective L3-S1 decompression with Dr Ge. Hospitalist consulted for comanagement of hypertension.     L3-S1 severe central stenosis  Lumbar radiculopathy  Neurogenic claudication  S/p L3-S1 decompression on 2/18/25 by Dr. Ge  - Admitted to inpatient.  - Post-op management (activity, pain control, DVT PPx / restart of antiplatelets, disposition, etc) as directed by Orthopedic Surgery.  - IV decadron added on 2/20/25 by Orthopedic Surgery.  - Aqua K pad available.  - Post-op hemoglobin stable at 8.9 on 2/20/25.  - PT and care transitions consulted.  - Pain control improving on 2/21/25.  - Anticipating discharge to TCU in the next few days pending pain control and bed availability.    Chronic HFpEF, compensated  Hypertension  AM creatinine WNL. /60 with HR 86. Lexiscan 1/28/25 notable for symptoms of dyspnea (chronic) without evidence of ischemia; showed preserved EF. Per Cards note 1/15, patient could stop Toprol and losartan if only on them for CHF. No additional instructions in chart from Cards or PCP regarding stopping these medications.   - Continue PTA Toprol and losartan with hold parameters.  - Continue PTA Lasix.  - Hold PTA empagliflozin; can resume as outpatient.  - Fluid restriction of 1,800 mL/day (given h/o hyponatremia and risk of SIADH due to pain, opiates).  - PRN Hydralazine available.  - Defer to Ortho Spine regarding when to restart PTA ASA 81 daily.  - Labs OK on 2/20/25, sodium within normal limits.    GERD  - Continue PTA protonix.    Cognitive impairment  - Reorient and redirect as needed.    Chronic pain  - Defer to Ortho  "Spine.          Diet: Regular Diet Adult  Fluid restriction 1800 ML FLUID    DVT Prophylaxis: Defer to primary service  Trimble Catheter: Not present  Lines: None     Cardiac Monitoring: None  Code Status: No CPR- Do NOT Intubate      Clinically Significant Risk Factors                              # Severe Obesity: Estimated body mass index is 40.13 kg/m  as calculated from the following:    Height as of this encounter: 1.575 m (5' 2\").    Weight as of this encounter: 99.5 kg (219 lb 6.4 oz)., PRESENT ON ADMISSION     # Financial/Environmental Concerns: none         Social Drivers of Health            Disposition Plan     Medically Ready for Discharge: Anticipated Tomorrow, defer to primary service             Martin Goff MD  Hospitalist Service  [facility]  Securely message with [employer] (more info)  Text page via [employer] Paging/Directory   ______________________________________________________________________    Interval History   John Lizama was seen this morning.  Pain better controlled overnight, increased this morning.  Has some generalized itching.  Denies fevers, chest pain, shortness of breath, nausea, abdominal pain.  No bowel movement since admission, discussed bowel regimen.  Family was in the room with her and interpreted for her, declined formal .    Physical Exam   Vital Signs: Temp: 97.7  F (36.5  C) Temp src: Oral BP: 128/66 Pulse: 83   Resp: 22 SpO2: 97 % O2 Device: None (Room air)    Weight: 219 lbs 6.4 oz    Constitutional: awake, alert, cooperative, no apparent distress, laying in the hospital bed with the head of the bed elevated  Respiratory: no increased work of breathing, coarse lung sounds at the bases  Cardiovascular: regular rate and rhythm, normal S1 and S2, systolic murmur noted  GI: normal bowel sounds, soft, non-distended, non-tender  Skin: warm, dry  Musculoskeletal: no lower extremity pitting edema present  Neurologic: awake, alert, answers " questions appropriately with family interpreting, moves all extremities    Medical Decision Making       40 MINUTES SPENT BY ME on the date of service doing chart review, history, exam, documentation & further activities per the note.      Data     I have personally reviewed the following data over the past 24 hrs:    N/A  \   8.9 (L)   / N/A     137 99 10.4 /  115 (H)   3.6 26 0.71 \

## 2025-02-21 NOTE — PLAN OF CARE
Goal Outcome Evaluation:    Goal Outcome Evaluation:     Patient ambulation status: Ax1 GB/W + brace  Patient able to stand for X-ray:NA  Standing/upright x-ray complete: NA  Patient using oral analgesics:yes  Voiding spontaneously: yes  Drains discontinued:yes  Incision clean and dry:yes  Bowel status:WNL      Pt denies chest pain and SOB. No PRN meds given for pain.

## 2025-02-21 NOTE — PROGRESS NOTES
Shift Summary [id number]    Admitting Diagnosis: Bilateral stenosis of lateral recess of lumbar spine [M48.061]  Foraminal stenosis of lumbosacral region [M48.07]  Inflammation of lumbosacral plexus [M54.17]  Lumbar radiculopathy, acute [M54.16]  Lumbar stenosis [M48.061]   Vitals:Baseline, RA, BP slightly elevated  Pain:PRN pain meds available  A&Ox4  Voiding:BR with assist   Mobility:A1GBW  Tele:NA  CMS:Intact  Lung Sounds:Clear  GI:Continent  Dressing: Back incision dressing CDI    Orders Placed This Encounter      Regular Diet Adult     Other: [place]dean speaking Family interpreting.

## 2025-02-21 NOTE — PROGRESS NOTES
Patient ambulation status: A x1 with walker and GB with brace on  Patient able to stand for X-ray:Yes  Standing/upright x-ray complete: Yes  Patient using oral analgesics:yes  Voiding spontaneously:yes  Drains discontinued:yes  Incision clean and dry:yes  Bowel status:BS active, no BM. On BM regimen    Pt A&O x4, Israeli speaking. Family at bedside. VSS.RA. back pain managed with schedule Tyl, PRN Robaxin and Atarax. CMS intact. Back incision site dry. Discharge pending. Home with home care VS TCU.

## 2025-02-21 NOTE — PROGRESS NOTES
Care Management Follow Up    Length of Stay (days): 3    Expected Discharge Date: 02/22/2025     Concerns to be Addressed: discharge planning     Patient plan of care discussed at interdisciplinary rounds: Yes    Anticipated Discharge Disposition: Transitional Care              Anticipated Discharge Services: None  Anticipated Discharge DME: None    Patient/family educated on Medicare website which has current facility and service quality ratings: yes  Education Provided on the Discharge Plan: Yes  Patient/Family in Agreement with the Plan: yes    Referrals Placed by CM/SW: Post Acute Facilities  Private pay costs discussed: Not applicable    Discussed  Partnership in Safe Discharge Planning  document with patient/family: No     Handoff Completed: No, handoff not indicated or clinically appropriate    Additional Information:  Per DOD,        Declined facilities do not have any bed availability. Writer contacted [facility] at [facility] and left a VM requesting an update.     Per discussion between SW and family yesterday, Referrals were to be sent to facilities around the [facility], which is where family lives. However, due to the ratings of facilities in the [facility], referrals were sent locally as well.     Writer stopped by and followed up with the patient and daughter at bedside. Daughter stated they wanted to focus on the [place] area and surrounding facilities. Writer updated on current referrals status and will send out more. Patient and daughter both consenting to the discharge plan. Writer discussed concern over patient's insurance potentially needing Insurance auth. The concenr with this is it could potentially be declined for coverage due to therapies currently stating patient is safe to go home. Writer stated he will discuss with therapies, but cannot make any promises regarding having them change the recommendation. Patient's daughter stated patient does not feel safe to go home and would prefer a  TCU.     Writer sent additional referrals.       Addendum 1410:  Per DOD, patient was declined from all facilities due to not having any beds available. However, patient was accepted to [facility].    Writer followed up with the patient and daughter at bedside and updated. Daughter stated they thought it would be too far to be able to stay with the patient. Writer stated to stay with the patient would not be acceptable to the TCU's. Patient's daughter contacted patient's granddaughter, Addy, to further discuss. Writer informed Addy on the current matter. Addy inquired if there was any way to order a bed for the patient as this was the primary reason they wanted a TCU. Writer stated he has worked in the past with getting a bed. However, the patient would need to meet a certain criteria to even qualify to get a rented bed. Addy stated they would need it to help the patient sit up in the bed. However, Addy stated patient's family would stay with her. Writer stated he will inquire with the care coordinator, however, there were steps to be taken in order to be considered to qualify for a bed.     Writer discussed with the care coordinator.     Writer contacted Tommy at [facility] to inquire if they would accommodate patient's family staying overnight. Tommy declined.     Next Steps: Discharge plan potentially changing to home with home care.     JOAN Barrett  [facility]  Social Work

## 2025-02-21 NOTE — PROGRESS NOTES
Care Management Follow Up    Length of Stay (days): 3    Expected Discharge Date: 02/22/2025     Concerns to be Addressed: discharge planning     Patient plan of care discussed at interdisciplinary rounds: Yes    Anticipated Discharge Disposition: Transitional Care              Anticipated Discharge Services: None  Anticipated Discharge DME: None    Patient/family educated on Medicare website which has current facility and service quality ratings: yes  Education Provided on the Discharge Plan: Yes  Patient/Family in Agreement with the Plan: yes    Referrals Placed by CM/SW: Post Acute Facilities  Private pay costs discussed: Not applicable    Discussed  Partnership in Safe Discharge Planning  document with patient/family: No     Handoff Completed: No, handoff not indicated or clinically appropriate    Additional Information:  Patient plan for discharge is home with family and DME hospital bed.  Dr. Ge notified and he did provide a DME order.  This was faxed to [facility] at [phone] at 2:55 pm.    Followed up with [facility] at 3:44 pm.  They have the order and additional information is needed to be documented in the medical record:       Need for Hospital bed   A) a condition requiring positioning of the body to alleviate pain, that cannot be accommodated in an ordinary bed: OR  B)  protection from serious injury that cannot be accommodated in an ordinary bed; OR  C)  a condition that requires the head of the bed to be elevated more than 30 degrees most of the time, where pillows or wedges did not meet the patient's needs (e.g. specify degree of elevation(; OR    D)  a condition taht requires special attachments (e.g. traction equipment) that cannot be fixed and used on an ordinary bed; AND  2. Additional semi-electric requirements:   Frequent changes in body position and/or immediate changes in body position are required to alleviate pain or address a medical condition, and the patient is able to use  the electric controls.    Not sure if patient would qualify for the above so left a message with granddaughter Priti at [phone].  Perhaps they will want to private pay for a bed.      Priti did not return call so writer spoke with son and daughter in the room.  Explained that she may or may not qualify for the bed.  And now late in the day with the home medical equipment companies closed now through the weekend.  Beds run around $800 to over $1000.  Explained that the bed would be semi-electric to raise the HOB and have a crank for height adjustment.  They are considering just buying the bed outright.  Provided the numbers and addresses for [employer], [facility], and [facility] [facility].  Neither of these companies are open over the weekend.  They are going to search for other providers on the web to see if they can obtain a hospital bed over the weekend.        Addendum:  Searched [employer] for hospital beds; several choices with delivery; handed off to daughter.        Angela Alcaraz RN  Inpatient Float Care Coordinator  [facility]  [email or web address]

## 2025-02-22 LAB
ANION GAP SERPL CALCULATED.3IONS-SCNC: 9 MMOL/L (ref 7–15)
BUN SERPL-MCNC: 21.3 MG/DL (ref 8–23)
CALCIUM SERPL-MCNC: 9.3 MG/DL (ref 8.8–10.4)
CHLORIDE SERPL-SCNC: 101 MMOL/L (ref 98–107)
CREAT SERPL-MCNC: 0.58 MG/DL (ref 0.51–0.95)
EGFRCR SERPLBLD CKD-EPI 2021: 90 ML/MIN/1.73M2
GLUCOSE SERPL-MCNC: 126 MG/DL (ref 70–99)
HCO3 SERPL-SCNC: 29 MMOL/L (ref 22–29)
POTASSIUM SERPL-SCNC: 4.1 MMOL/L (ref 3.4–5.3)
SODIUM SERPL-SCNC: 139 MMOL/L (ref 135–145)

## 2025-02-22 PROCEDURE — 120N000001 HC R&B MED SURG/OB

## 2025-02-22 PROCEDURE — 80048 BASIC METABOLIC PNL TOTAL CA: CPT | Performed by: HOSPITALIST

## 2025-02-22 PROCEDURE — 250N000011 HC RX IP 250 OP 636: Performed by: STUDENT IN AN ORGANIZED HEALTH CARE EDUCATION/TRAINING PROGRAM

## 2025-02-22 PROCEDURE — 36415 COLL VENOUS BLD VENIPUNCTURE: CPT | Performed by: HOSPITALIST

## 2025-02-22 PROCEDURE — 250N000013 HC RX MED GY IP 250 OP 250 PS 637: Performed by: STUDENT IN AN ORGANIZED HEALTH CARE EDUCATION/TRAINING PROGRAM

## 2025-02-22 PROCEDURE — 250N000013 HC RX MED GY IP 250 OP 250 PS 637: Performed by: PHYSICIAN ASSISTANT

## 2025-02-22 PROCEDURE — 99232 SBSQ HOSP IP/OBS MODERATE 35: CPT | Performed by: INTERNAL MEDICINE

## 2025-02-22 PROCEDURE — 250N000013 HC RX MED GY IP 250 OP 250 PS 637: Performed by: INTERNAL MEDICINE

## 2025-02-22 RX ADMIN — EMPAGLIFLOZIN 10 MG: 10 TABLET, FILM COATED ORAL at 15:45

## 2025-02-22 RX ADMIN — METOPROLOL SUCCINATE 25 MG: 25 TABLET, EXTENDED RELEASE ORAL at 10:19

## 2025-02-22 RX ADMIN — LOSARTAN POTASSIUM 50 MG: 50 TABLET, FILM COATED ORAL at 10:19

## 2025-02-22 RX ADMIN — DEXAMETHASONE SODIUM PHOSPHATE 4 MG: 4 INJECTION, SOLUTION INTRAMUSCULAR; INTRAVENOUS at 05:33

## 2025-02-22 RX ADMIN — ACETAMINOPHEN 975 MG: 325 TABLET, FILM COATED ORAL at 15:45

## 2025-02-22 RX ADMIN — PANTOPRAZOLE SODIUM 40 MG: 40 TABLET, DELAYED RELEASE ORAL at 10:19

## 2025-02-22 RX ADMIN — FUROSEMIDE 40 MG: 40 TABLET ORAL at 10:19

## 2025-02-22 RX ADMIN — OXYCODONE HYDROCHLORIDE 5 MG: 5 TABLET ORAL at 18:19

## 2025-02-22 RX ADMIN — SENNOSIDES AND DOCUSATE SODIUM 1 TABLET: 50; 8.6 TABLET ORAL at 10:19

## 2025-02-22 RX ADMIN — METHOCARBAMOL 250 MG: 500 TABLET ORAL at 11:31

## 2025-02-22 RX ADMIN — SENNOSIDES AND DOCUSATE SODIUM 1 TABLET: 50; 8.6 TABLET ORAL at 20:08

## 2025-02-22 RX ADMIN — ATORVASTATIN CALCIUM 10 MG: 10 TABLET, FILM COATED ORAL at 10:19

## 2025-02-22 RX ADMIN — ACETAMINOPHEN 975 MG: 325 TABLET, FILM COATED ORAL at 05:33

## 2025-02-22 RX ADMIN — ACETAMINOPHEN 975 MG: 325 TABLET, FILM COATED ORAL at 20:08

## 2025-02-22 RX ADMIN — POLYETHYLENE GLYCOL 3350 17 G: 17 POWDER, FOR SOLUTION ORAL at 10:20

## 2025-02-22 RX ADMIN — DEXAMETHASONE SODIUM PHOSPHATE 4 MG: 4 INJECTION, SOLUTION INTRAMUSCULAR; INTRAVENOUS at 10:20

## 2025-02-22 RX ADMIN — OXYCODONE HYDROCHLORIDE 5 MG: 5 TABLET ORAL at 10:19

## 2025-02-22 NOTE — PROGRESS NOTES
Care Management Follow Up    Length of Stay (days): 4    Expected Discharge Date: 02/22/2025     Concerns to be Addressed: discharge planning     Patient plan of care discussed at interdisciplinary rounds: Yes    Anticipated Discharge Disposition: Home, Home Care              Anticipated Discharge Services: Home Care  Anticipated Discharge DME: Bed    Patient/family educated on Medicare website which has current facility and service quality ratings: yes  Education Provided on the Discharge Plan: Yes  Patient/Family in Agreement with the Plan: yes    Referrals Placed by CM/SW: Homecare  Private pay costs discussed: See note from 2/21 regarding hospital bed  Discussed  Partnership in Safe Discharge Planning  document with patient/family: No     Handoff Completed: No, handoff not indicated or clinically appropriate    Additional Information:  Per chart review, plan is for patient to discharge home with homecare and referral process has been started for hospital bed thru [facility] and family is looking into other options for purchasing bed.    Next Steps: Await discharge orders, determine transportation at discharge. Writer anabela LOPEZ with update.    Lorri Carpenter RN Care Coordinator  [facility]  [phone]

## 2025-02-22 NOTE — PROGRESS NOTES
"Progress Note    Post-operative Day: 4 Day Post-Op    Procedure(s):  Lumbar 3 to Sacral 1 Bilateral Decompression    Subjective:  Reports that her pain is controlled. States that she is walking more and more, some muscular discomfort in the right thigh. Back pain is more manageable but waxes and wanes, states that positioning in bed is the worst. She overall feels improvement.     Reports cough is improving. HTN overnight and this morning. Family concerned regarding difficulty stabilizing blood pressure.    TCU placement deferred as patient family wants to sleep over in the facility and this was not available.  Patient condition does not meet the need required for hospital bed. Patient family will try to find choices to buy this weekend as well.     Objective:  Blood pressure (!) 174/85, pulse 83, temperature 97.8  F (36.6  C), temperature source Oral, resp. rate 18, height 1.575 m (5' 2\"), weight 99.5 kg (219 lb 6.4 oz), SpO2 100%.    Patient Vitals for the past 24 hrs:   BP Temp Temp src Pulse Resp SpO2   02/22/25 1026 (!) 174/85 -- -- 83 -- --   02/22/25 0759 (!) 169/82 97.8  F (36.6  C) Oral 81 18 100 %   02/21/25 2139 (!) 176/85 98  F (36.7  C) Oral 84 20 99 %   02/21/25 1524 (!) 158/82 97.7  F (36.5  C) Oral 85 -- 94 %       Wt Readings from Last 4 Encounters:   02/18/25 99.5 kg (219 lb 6.4 oz)   05/26/23 96.2 kg (212 lb)   03/17/23 95.7 kg (211 lb)     Motor function, sensation, and circulation intact   Yes  Wound status: incisions are clean dry and intact. Yes    Pertinent Labs   Lab Results: personally reviewed.     Recent Labs   Lab Test 03/24/23  1650   *     Plan:   1.Anticoagulation protocol: Ambulation and SCDs  2.Pain medications:  Current regimen, muscle relaxer PRN  3.Weight bearing status:  WBAT, AAT  4. Lumbar brace OOB only, does not need when in a chair.   5.Continue cares and rehabilitation   6. Hospital medicine consult for co-management   7. Drain discontinued  8. PT, encourage " mobilization  9. Decadron drip discontinued   10.Incentive spirometer  11.Dispo: Home with assist and hospital bed  12.Can resume ASA on discharge    Report completed by:  LUISA BECKHAM MD  Date: 2/19/2025  Time: 7:15 AM

## 2025-02-22 NOTE — PROGRESS NOTES
Pt Vitally stable on RA ex HTN, A1gb/w, needs met this shift, Back incision CDI, pain controlled w/ scheduled med. L PIV SL, D/C home vs TCU.

## 2025-02-22 NOTE — PROGRESS NOTES
Care Management Follow Up    Length of Stay (days): 4    Expected Discharge Date: 02/22/2025     Concerns to be Addressed: discharge planning     Patient plan of care discussed at interdisciplinary rounds: Yes    Anticipated Discharge Disposition: Home, Home Care              Anticipated Discharge Services: Home Care  Anticipated Discharge DME: Bed    Patient/family educated on Medicare website which has current facility and service quality ratings: yes  Education Provided on the Discharge Plan: Yes  Patient/Family in Agreement with the Plan: yes    Referrals Placed by CM/SW: Homecare  Private pay costs discussed: Not applicable    Discussed  Partnership in Safe Discharge Planning  document with patient/family: No     Handoff Completed: No, handoff not indicated or clinically appropriate    Additional Information:  Per hospitalist, patient is medically ready to discharge.  Writer contacted Dr. Ge regarding readiness to discharge from surgical perspective and stated that patient should be able to discharge on 2/23 with home PT/OT thru [facility] (writer called [facility] and confirmed patient's PCP with them).  Writer met with pt and 3 family members and they are on board with discharge tomorrow and family to transport.    Next Steps: Continue to follow and assist with safe discharge plan.    Lorri Carpenter RN Care Coordinator  [facility]  [phone]

## 2025-02-22 NOTE — PROGRESS NOTES
[facility]    Internal Medicine Hospitalist Progress Note  02/22/2025  I evaluated patient on the above date.    Lion Feliz Jr., MD  [phone] (p)  Text Page  Vocera      [New actions/orders today (02/22/2025) are underlined in the assessment and plan. All lab results in the assessment and plan were reviewed.]  Assessment & Plan   John Lizama is a 80 year old female with history including hypertension; chronic HFpEF; moderate AS; PMR; cognitive impairment; BLE edema; GERD; iron deficiency anemia (Hgb 8.5-9.4); LBBB; obesity; and h/o hyponatremia; who was admitted on 2/18/2025 for an elective L3-S1 decompression with Dr Ge. Internal Medicine consulted for comanagement of hypertension.       L3-S1 severe central stenosis with lumbar radiculopathy and neurogenic claudication - s/p L3-S1 decompression on 2/18/2025.  * Underwent surgery as above.  * IV dexamethasone added by Orthopedic surgery 2/20.  * Pain improved 2/21.  - Continue acetaminophen 975 mg q8h; PRN hydroxyzine, PRN methocarbamol, PRN oxycodone, PRN IV hydromorphone; minimize opioids as able.  - Continue PRN Aqua K pad available.    Blood loss anemia.  * Hgb 8.9 on 2/20.   Recent Labs   Lab 02/20/25  1058   HGB 8.9*   - Continue to monitor CBC periodically as needed.    Chronic HFpEF, compensated.  Hypertension (benign essential), suspect worsened due to pain.  [PTA: empagliflozin 10 mg daily; furosemide 40 mg daily; losartan 50 mg daily; metoprolol ER 25 mg daily.]  * Lexiscan 1/28/25 notable for symptoms of dyspnea (chronic) without evidence of ischemia; showed preserved EF. Per Cards note 1/15, patient could stop Toprol and losartan if only on them for CHF. No additional instructions in chart from Cards or PCP regarding stopping these medications.  * Empagliflozin held this hospitalization.   Vitals:    02/18/25 0632   Weight: 99.5 kg (219 lb 6.4 oz)     I/O last 3 completed shifts:  In: 480 [P.O.:480]  Out: -   Recent  "Labs   Lab 02/22/25  0640 02/20/25  1058 02/18/25  1923    137  --    CO2 29 26  --    BUN 21.3 10.4  --    CR 0.58 0.71 0.85   POTASSIUM 4.1 3.6  --    - Restart empagliflozin 10 mg daily.  - Continue furosemide, losartan and metoprolol ER.  - Continue fluid restriction of 1,800 mL/day (given h/o hyponatremia and risk of SIADH due to pain, opiates).  - Continue PRN hydralazine available.    GERD.  - Continue pantoprazole.    Cognitive impairment  - Continue to treat other issues as noted.  - Re-orient as needed.  - Maintain normal day/night, sleep/wake cycles.  - Minimize sedating medications as able.    Severe obesity.  * Body mass index is 40.13 kg/m .  - Needs to continue to pursue aggressive dietary and lifestyle modifications.    Clinically Significant Risk Factors                              # Severe Obesity: Estimated body mass index is 40.13 kg/m  as calculated from the following:    Height as of this encounter: 1.575 m (5' 2\").    Weight as of this encounter: 99.5 kg (219 lb 6.4 oz).      # Financial/Environmental Concerns: none         Diet: Regular Diet Adult  Fluid restriction 1800 ML FLUID    Prophylaxis: PCD's and ambulation  Trimble Catheter: Not present  Lines: None     Code Status: No CPR- Do NOT Intubate    Disposition Plan   Medically Ready for Discharge: Anticipated Today  Expected discharge to  home with hospital bed  per Orthopedic surgery. OK to discharge from IM standpoint.    Entered: Lion Feliz MD 02/22/2025, 2:23 PM     COMMUNICATION  - I discussed with patient's family 02/22/25          Interval History   Seen with family, who translated.  Doing about the same.  Notes BP is better today.  Concerned about BP's being stable before discharge.    * Data reviewed today: I reviewed all new labs and imaging over the last 24 hours. I personally reviewed no images or ECG's today.    Physical Exam   Most recent vitals:   , Blood pressure (!) 134/95, pulse 78, temperature 97.8  F " "(36.6  C), temperature source Oral, resp. rate 18, height 1.575 m (5' 2\"), weight 99.5 kg (219 lb 6.4 oz), SpO2 100%. O2 Device: None (Room air)    Vitals:    02/18/25 0632   Weight: 99.5 kg (219 lb 6.4 oz)     Vital signs with ranges:  Temp:  [97.7  F (36.5  C)-98  F (36.7  C)] 97.8  F (36.6  C)  Pulse:  [78-85] 78  Resp:  [18-20] 18  BP: (134-176)/(82-95) 134/95  SpO2:  [94 %-100 %] 100 %  Patient Vitals for the past 24 hrs:   BP Temp Temp src Pulse Resp SpO2   02/22/25 1132 (!) 134/95 -- -- 78 -- --   02/22/25 1026 (!) 174/85 -- -- 83 -- --   02/22/25 0759 (!) 169/82 97.8  F (36.6  C) Oral 81 18 100 %   02/21/25 2139 (!) 176/85 98  F (36.7  C) Oral 84 20 99 %   02/21/25 1524 (!) 158/82 97.7  F (36.5  C) Oral 85 -- 94 %     I/O's last 24 hours:  I/O last 3 completed shifts:  In: 480 [P.O.:480]  Out: -     Constitutional: awake, alert, oriented, pleasant   Head:   Eyes:   ENT:   Neck:   Cardiovascular: regular rate and rhythm; no murmurs, rubs or gallops  Lungs: diminished in the bases, no crackles or wheezes  Gastrointestinal/Abdomen: soft, non-tender, non-distended, positive bowel sounds  :   Musculoskeletal:   Skin/Extremities:   Neurologic:   Psychiatric:   Hematologic/Lymphatic/Immunologic:        Labs reviewed.  Recent Labs   Lab 02/22/25  0640 02/20/25  1058 02/18/25  1923   HGB  --  8.9*  --     137  --    POTASSIUM 4.1 3.6  --    CHLORIDE 101 99  --    CO2 29 26  --    BUN 21.3 10.4  --    CR 0.58 0.71 0.85   ANIONGAP 9 12  --    TOMI 9.3 9.2  --    * 115*  --      No lab results found.  Recent Labs   Lab 02/22/25  0640 02/20/25  1058   * 115*     No lab results found.    No results for input(s): \"INR\", \"[id number]\" in the last 168 hours.  Recent Labs   Lab 02/18/25  1923   LACT 1.9       MICRO:  Cultures (including blood and urine):  No lab results found in last 7 days.    No results found for this or any previous visit (from the past 24 hours).    Medications   All medications were " reviewed. MAR.    Infusions:  Current Facility-Administered Medications   Medication Dose Route Frequency Provider Last Rate Last Admin     Scheduled Medications:  Current Facility-Administered Medications   Medication Dose Route Frequency Provider Last Rate Last Admin    acetaminophen (TYLENOL) tablet 975 mg  975 mg Oral Q8H Joel Ge MD   975 mg at 02/22/25 0533    atorvastatin (LIPITOR) tablet 10 mg  10 mg Oral Daily Joel Ge MD   10 mg at 02/22/25 1019    furosemide (LASIX) tablet 40 mg  40 mg Oral Daily Joel Ge MD   40 mg at 02/22/25 1019    losartan (COZAAR) tablet 50 mg  50 mg Oral Daily Angie Ortiz PA-C   50 mg at 02/22/25 1019    metoprolol succinate ER (TOPROL XL) 24 hr tablet 25 mg  25 mg Oral Daily Angie Ortiz PA-C   25 mg at 02/22/25 1019    pantoprazole (PROTONIX) EC tablet 40 mg  40 mg Oral QAM AC Joel Ge MD   40 mg at 02/22/25 1019    polyethylene glycol (MIRALAX) Packet 17 g  17 g Oral Daily Joel Ge MD   17 g at 02/22/25 1020    senna-docusate (SENOKOT-S/PERICOLACE) 8.6-50 MG per tablet 1 tablet  1 tablet Oral BID Joel Ge MD   1 tablet at 02/22/25 1019    sodium chloride (PF) 0.9% PF flush 3 mL  3 mL Intracatheter Q8H Joel Ge MD   3 mL at 02/21/25 1718     PRN Medications:  Current Facility-Administered Medications   Medication Dose Route Frequency Provider Last Rate Last Admin    artificial saliva (BIOTENE MT) solution 2 spray  2 spray Swish & Spit 4x Daily PRN Martin Goff MD        bisacodyl (DULCOLAX) suppository 10 mg  10 mg Rectal Daily PRN Joel Ge MD        hydrALAZINE (APRESOLINE) injection 10 mg  10 mg Intravenous Q4H PRN Angie Ortiz PA-C        HYDROmorphone (DILAUDID) injection 0.1 mg  0.1 mg Intravenous Q4H PRN Joel Ge MD        Or    HYDROmorphone (DILAUDID) injection 0.2 mg  0.2 mg Intravenous Q4H PRN Joel Ge MD        hydrOXYzine HCl (ATARAX) tablet 10 mg  10 mg Oral TID PRN Martin Goff  MD Joni   10 mg at 02/21/25 2250    lidocaine (LMX4) cream   Topical Q1H PRN Joel Ge MD        lidocaine 1 % 0.1-1 mL  0.1-1 mL Other Q1H PRN Joel Ge MD        magnesium hydroxide (MILK OF MAGNESIA) suspension 30 mL  30 mL Oral Daily PRN Joel Ge MD        methocarbamol (ROBAXIN) half-tab 250 mg  250 mg Oral Q6H PRN Joel Ge MD   250 mg at 02/22/25 1131    naloxone (NARCAN) injection 0.2 mg  0.2 mg Intravenous Q2 Min PRN Joel Ge MD        Or    naloxone (NARCAN) injection 0.4 mg  0.4 mg Intravenous Q2 Min PRN Joel Ge MD        Or    naloxone (NARCAN) injection 0.2 mg  0.2 mg Intramuscular Q2 Min PRN Joel Ge MD        Or    naloxone (NARCAN) injection 0.4 mg  0.4 mg Intramuscular Q2 Min PRN Joel Ge MD        oxyCODONE IR (ROXICODONE) half-tab 2.5 mg  2.5 mg Oral Q4H PRN Joel Ge MD   2.5 mg at 02/20/25 1114    Or    oxyCODONE (ROXICODONE) tablet 5 mg  5 mg Oral Q4H PRN Joel Ge MD   5 mg at 02/22/25 1019    sodium chloride (PF) 0.9% PF flush 3 mL  3 mL Intracatheter q1 min prn Joel Ge MD   3 mL at 02/19/25 1212

## 2025-02-23 VITALS
RESPIRATION RATE: 18 BRPM | TEMPERATURE: 98.3 F | HEIGHT: 62 IN | WEIGHT: 219.4 LBS | HEART RATE: 81 BPM | OXYGEN SATURATION: 98 % | SYSTOLIC BLOOD PRESSURE: 174 MMHG | DIASTOLIC BLOOD PRESSURE: 74 MMHG | BODY MASS INDEX: 40.37 KG/M2

## 2025-02-23 PROCEDURE — 250N000013 HC RX MED GY IP 250 OP 250 PS 637: Performed by: INTERNAL MEDICINE

## 2025-02-23 PROCEDURE — 99232 SBSQ HOSP IP/OBS MODERATE 35: CPT | Performed by: INTERNAL MEDICINE

## 2025-02-23 PROCEDURE — 250N000013 HC RX MED GY IP 250 OP 250 PS 637: Performed by: PHYSICIAN ASSISTANT

## 2025-02-23 PROCEDURE — 250N000013 HC RX MED GY IP 250 OP 250 PS 637: Performed by: STUDENT IN AN ORGANIZED HEALTH CARE EDUCATION/TRAINING PROGRAM

## 2025-02-23 RX ORDER — ACETAMINOPHEN 500 MG
500-1000 TABLET ORAL EVERY 6 HOURS PRN
Qty: 60 TABLET | Refills: 0 | Status: SHIPPED | OUTPATIENT
Start: 2025-02-23

## 2025-02-23 RX ORDER — HYDROXYZINE HYDROCHLORIDE 25 MG/1
25 TABLET, FILM COATED ORAL EVERY 6 HOURS PRN
Qty: 30 TABLET | Refills: 0 | Status: SHIPPED | OUTPATIENT
Start: 2025-02-23

## 2025-02-23 RX ORDER — AMOXICILLIN 250 MG
1 CAPSULE ORAL DAILY
Qty: 30 TABLET | Refills: 0 | Status: SHIPPED | OUTPATIENT
Start: 2025-02-23

## 2025-02-23 RX ORDER — OXYCODONE HYDROCHLORIDE 5 MG/1
5 TABLET ORAL EVERY 6 HOURS PRN
Qty: 30 TABLET | Refills: 0 | Status: SHIPPED | OUTPATIENT
Start: 2025-02-23 | End: 2025-03-10

## 2025-02-23 RX ADMIN — POLYETHYLENE GLYCOL 3350 17 G: 17 POWDER, FOR SOLUTION ORAL at 08:30

## 2025-02-23 RX ADMIN — LOSARTAN POTASSIUM 50 MG: 50 TABLET, FILM COATED ORAL at 08:30

## 2025-02-23 RX ADMIN — EMPAGLIFLOZIN 10 MG: 10 TABLET, FILM COATED ORAL at 08:30

## 2025-02-23 RX ADMIN — METOPROLOL SUCCINATE 25 MG: 25 TABLET, EXTENDED RELEASE ORAL at 08:30

## 2025-02-23 RX ADMIN — ACETAMINOPHEN 975 MG: 325 TABLET, FILM COATED ORAL at 04:08

## 2025-02-23 RX ADMIN — FUROSEMIDE 40 MG: 40 TABLET ORAL at 08:30

## 2025-02-23 RX ADMIN — SENNOSIDES AND DOCUSATE SODIUM 1 TABLET: 50; 8.6 TABLET ORAL at 08:30

## 2025-02-23 RX ADMIN — PANTOPRAZOLE SODIUM 40 MG: 40 TABLET, DELAYED RELEASE ORAL at 08:30

## 2025-02-23 RX ADMIN — ATORVASTATIN CALCIUM 10 MG: 10 TABLET, FILM COATED ORAL at 08:30

## 2025-02-23 ASSESSMENT — ACTIVITIES OF DAILY LIVING (ADL)
ADLS_ACUITY_SCORE: 35
ADLS_ACUITY_SCORE: 37
ADLS_ACUITY_SCORE: 35

## 2025-02-23 NOTE — PROGRESS NOTES
Care Management Discharge Note    Discharge Date: 02/23/2025       Discharge Disposition: Home, Home Care    Discharge Services: Home Care    Discharge DME: Bed    Discharge Transportation: family or friend will provide, agency    Private pay costs discussed: Not applicable    Does the patient's insurance plan have a 3 day qualifying hospital stay waiver?  No    PAS Confirmation Code:    Patient/family educated on Medicare website which has current facility and service quality ratings: yes    Education Provided on the Discharge Plan: Yes  Persons Notified of Discharge Plans: MD. RN, see notes about family from yesterday  Patient/Family in Agreement with the Plan: yes    Handoff Referral Completed: No, handoff not indicated or clinically appropriate    Additional Information:  Home care orders completed for discharge. Home care agency is [facility].     Moon Menard RN   [facility]   Phone [phone], Vocera or [phone]

## 2025-02-23 NOTE — DISCHARGE INSTRUCTIONS
Dr. Ge Discharge instructions     INCISION  The incision will be covered by a dressing. You may remove this dressing after 10 days. Then you may use gauze and paper tape to cover the incision to avoid irritation by clothing or brace.  Please check your incision at least once daily for signs and symptoms of infection after the week neto:  If any of the below should occur, please call the office.  -Drainage from incisional site after several days  -Opening of incisions  -Fevers greater than 101  -Flu-like symptoms  -Increased redness and/or tenderness   -If you have staples or sutures (not tape) in your incision they may be removed 2 weeks following your surgery.  This may be done by a visiting nurse, family physician or by making an appointment to come into the office.               SHOWERING  Do not shower until 10 days after surgery. At that point, you may shower but no direct water on the surgical incision. Make sure to dry the incision and cover with dry gauze. No tub baths, hot tubs, or a whirlpool until your wound is completely healed at approximately 6-8 weeks.    EXERCISE  -Lift objects weighing less than 10 lbs  -Do not bend or twist at the waist-always bend your knees!!  -Limit your sitting to 30 minute intervals.  You should lie down or walk in between sitting periods.  There are no limitations for sitting in a recliner chair.  -Walk as much as possible-let discomfort be your guide.  You may also go up and down stairs as much as you can tolerate.  Walking outside (as long as it is nice weather) or walking on a treadmill is permitted (no incline).    BRACE:  You will need a brace to be used when you are walking. You do not sleep in the brace or need it for sitting(but may use if comfortable).     PAIN  Take pain medication as prescribed.  You may use ibuprofen and/or Tylenol as needed for pain. The goal is to wean off all narcotic medication after two-three weeks.      DRIVING   You may NOT drive a car  until completely off narcotic pain medications. You may be a passenger in a car.  If you must take a longer trip, make sure to make stops approximately every 1.5-2 hours so that you can walk around and stretch your legs to prevent blood clots.  Reclining in the passenger seat may be the most comfortable position.      FOLLOW-UP  You will have a follow-up appointment in approximately 2-3 weeks from surgery.

## 2025-02-23 NOTE — PROGRESS NOTES
Pt A&O x4. [place] speaking, family at bedside. Up x1 with walker and GB. VSS ex BP high. MD aware. Back incision site dry and intact. Pain managed with schedule med. AVS and med reviewed with pt and family. All questions answered. Pt will discharge home with family transport.

## 2025-02-23 NOTE — PROGRESS NOTES
[facility]    Internal Medicine Hospitalist Progress Note  02/23/2025  I evaluated patient on the above date.    Lion Feliz Jr., MD  [phone] (p)  Text Page  Vocera      [New actions/orders today (02/23/2025) are underlined in the assessment and plan. All lab results in the assessment and plan were reviewed.]  Assessment & Plan   John Lizama is a 80 year old female with history including hypertension; chronic HFpEF; moderate AS; PMR; cognitive impairment; BLE edema; GERD; iron deficiency anemia (Hgb 8.5-9.4); LBBB; obesity; and h/o hyponatremia; who was admitted on 2/18/2025 for an elective L3-S1 decompression with Dr Ge. Internal Medicine consulted for comanagement of hypertension.       L3-S1 severe central stenosis with lumbar radiculopathy and neurogenic claudication - s/p L3-S1 decompression on 2/18/2025.  * Underwent surgery as above.  * IV dexamethasone added by Orthopedic surgery 2/20.  * Pain improved 2/21.  - Continue acetaminophen 975 mg q8h; PRN hydroxyzine, PRN methocarbamol, PRN oxycodone, PRN IV hydromorphone; minimize opioids as able.  - Continue PRN Aqua K pad available.    Blood loss anemia.  * Hgb 8.9 on 2/20.   Recent Labs   Lab 02/20/25  1058   HGB 8.9*   - Continue to monitor CBC periodically as needed.    Chronic HFpEF, compensated.  Hypertension (benign essential), suspect worsened due to pain.  [PTA: empagliflozin 10 mg daily; furosemide 40 mg daily; losartan 50 mg daily; metoprolol ER 25 mg daily.]  * Lexiscan 1/28/25 notable for symptoms of dyspnea (chronic) without evidence of ischemia; showed preserved EF. Per Cards note 1/15, patient could stop Toprol and losartan if only on them for CHF. No additional instructions in chart from Cards or PCP regarding stopping these medications.  * Empagliflozin held this hospitalization, restarted 2/22.  Vitals:    02/18/25 0632   Weight: 99.5 kg (219 lb 6.4 oz)     No intake/output data recorded.  Recent Labs   Lab  "02/22/25  0640 02/20/25  1058 02/18/25  1923    137  --    CO2 29 26  --    BUN 21.3 10.4  --    CR 0.58 0.71 0.85   POTASSIUM 4.1 3.6  --    - Overall, anticipate improvement in BP's once at home and once pain continues to improve (family notes BP's usually well controlled).  - She has follow-up with PCP clinic this week; consider increasing losartan if BP's are uncontrolled.  - Continue empagliflozin 10 mg daily; furosemide 40 mg daily; losartan 50 mg daily; metoprolol ER 25 mg daily.  - Continue PRN hydralazine available.    GERD.  - Continue pantoprazole.    Cognitive impairment  - Continue to treat other issues as noted.  - Re-orient as needed.  - Maintain normal day/night, sleep/wake cycles.  - Minimize sedating medications as able.    Severe obesity.  * Body mass index is 40.13 kg/m .  - Needs to continue to pursue aggressive dietary and lifestyle modifications.    Clinically Significant Risk Factors                              # Severe Obesity: Estimated body mass index is 40.13 kg/m  as calculated from the following:    Height as of this encounter: 1.575 m (5' 2\").    Weight as of this encounter: 99.5 kg (219 lb 6.4 oz).        # Financial/Environmental Concerns: none         Diet: Regular Diet Adult  Fluid restriction 1800 ML FLUID  Diet    Prophylaxis: PCD's and ambulation  Trimble Catheter: Not present  Lines: None     Code Status: No CPR- Do NOT Intubate    Disposition Plan   Medically Ready for Discharge: Anticipated Today  Expected discharge to  home with hospital bed  per Orthopedic surgery. OK to discharge from IM standpoint.    Entered: Lion Feliz MD 02/23/2025, 11:52 AM       COMMUNICATION  - I discussed with patient's family 02/23/25      Interval History   Seen with family who translated.  Overall doing better.  Still concerns about blood pressure, but re-assured her that should improve once at home with further recovery from surgery. Family notes, BP's usually well " "controlled.    * Data reviewed today: I reviewed all new labs and imaging over the last 24 hours. I personally reviewed no images or ECG's today.    Physical Exam   Most recent vitals:   , Blood pressure (!) 174/74, pulse 81, temperature 98.3  F (36.8  C), temperature source Oral, resp. rate 18, height 1.575 m (5' 2\"), weight 99.5 kg (219 lb 6.4 oz), SpO2 98%. O2 Device: None (Room air)    Vitals:    02/18/25 0632   Weight: 99.5 kg (219 lb 6.4 oz)     Vital signs with ranges:  Temp:  [97.7  F (36.5  C)-98.3  F (36.8  C)] 98.3  F (36.8  C)  Pulse:  [72-91] 81  Resp:  [18] 18  BP: (163-177)/(74-98) 174/74  SpO2:  [97 %-100 %] 98 %  Patient Vitals for the past 24 hrs:   BP Temp Temp src Pulse Resp SpO2   02/23/25 0834 (!) 174/74 -- -- -- -- --   02/23/25 0739 (!) 177/98 -- -- 81 -- 98 %   02/23/25 0411 (!) 163/91 98.3  F (36.8  C) Oral 91 18 99 %   02/22/25 2007 (!) 166/89 98  F (36.7  C) Oral 84 18 100 %   02/22/25 1700 (!) 169/94 -- -- 79 -- --   02/22/25 1601 (!) 175/85 97.7  F (36.5  C) Oral 72 18 97 %     I/O's last 24 hours:  No intake/output data recorded.    Constitutional: awake, alert, oriented, pleasant   Head:   Eyes:   ENT:   Neck:   Cardiovascular: regular rate and rhythm; no murmurs, rubs or gallops  Lungs: diminished in the bases, no crackles or wheezes  Gastrointestinal/Abdomen: +back brace in place  :   Musculoskeletal:   Skin/Extremities:   Neurologic:   Psychiatric:   Hematologic/Lymphatic/Immunologic:        Labs reviewed.  Recent Labs   Lab 02/22/25  0640 02/20/25  1058 02/18/25  1923   HGB  --  8.9*  --     137  --    POTASSIUM 4.1 3.6  --    CHLORIDE 101 99  --    CO2 29 26  --    BUN 21.3 10.4  --    CR 0.58 0.71 0.85   ANIONGAP 9 12  --    TOMI 9.3 9.2  --    * 115*  --      No lab results found.  Recent Labs   Lab 02/22/25  0640 02/20/25  1058   * 115*     No lab results found.    No results for input(s): \"INR\", \"[id number]\" in the last 168 hours.  Recent Labs   Lab " 02/18/25  1923   LACT 1.9       MICRO:  Cultures (including blood and urine):  No lab results found in last 7 days.    No results found for this or any previous visit (from the past 24 hours).    Medications   All medications were reviewed. MAR.    Infusions:  Current Facility-Administered Medications   Medication Dose Route Frequency Provider Last Rate Last Admin     Scheduled Medications:  Current Facility-Administered Medications   Medication Dose Route Frequency Provider Last Rate Last Admin    acetaminophen (TYLENOL) tablet 975 mg  975 mg Oral Q8H Joel Ge MD   975 mg at 02/23/25 0408    atorvastatin (LIPITOR) tablet 10 mg  10 mg Oral Daily Joel Ge MD   10 mg at 02/23/25 0830    empagliflozin (JARDIANCE) tablet 10 mg  10 mg Oral Daily Lion Feliz MD   10 mg at 02/23/25 0830    furosemide (LASIX) tablet 40 mg  40 mg Oral Daily Joel Ge MD   40 mg at 02/23/25 0830    losartan (COZAAR) tablet 50 mg  50 mg Oral Daily Angie Ortiz PA-C   50 mg at 02/23/25 0830    metoprolol succinate ER (TOPROL XL) 24 hr tablet 25 mg  25 mg Oral Daily Angie Ortiz PA-C   25 mg at 02/23/25 0830    pantoprazole (PROTONIX) EC tablet 40 mg  40 mg Oral QAM AC Joel Ge MD   40 mg at 02/23/25 0830    polyethylene glycol (MIRALAX) Packet 17 g  17 g Oral Daily Joel Ge MD   17 g at 02/23/25 0830    senna-docusate (SENOKOT-S/PERICOLACE) 8.6-50 MG per tablet 1 tablet  1 tablet Oral BID Joel Ge MD   1 tablet at 02/23/25 0830    sodium chloride (PF) 0.9% PF flush 3 mL  3 mL Intracatheter Q8H Joel Ge MD   3 mL at 02/21/25 1718     PRN Medications:  Current Facility-Administered Medications   Medication Dose Route Frequency Provider Last Rate Last Admin    artificial saliva (BIOTENE MT) solution 2 spray  2 spray Swish & Spit 4x Daily PRN Martin Goff MD        bisacodyl (DULCOLAX) suppository 10 mg  10 mg Rectal Daily PRN Joel Ge MD        hydrALAZINE (APRESOLINE)  injection 10 mg  10 mg Intravenous Q4H PRN Angie Ortiz PA-C        HYDROmorphone (DILAUDID) injection 0.1 mg  0.1 mg Intravenous Q4H PRN Joel Ge MD        Or    HYDROmorphone (DILAUDID) injection 0.2 mg  0.2 mg Intravenous Q4H PRN Joel Ge MD        hydrOXYzine HCl (ATARAX) tablet 10 mg  10 mg Oral TID PRN Martin Goff MD   10 mg at 02/21/25 2250    lidocaine (LMX4) cream   Topical Q1H PRN Joel Ge MD        lidocaine 1 % 0.1-1 mL  0.1-1 mL Other Q1H PRN Joel Ge MD        magnesium hydroxide (MILK OF MAGNESIA) suspension 30 mL  30 mL Oral Daily PRN Joel Ge MD        methocarbamol (ROBAXIN) half-tab 250 mg  250 mg Oral Q6H PRN Joel Ge MD   250 mg at 02/22/25 1131    naloxone (NARCAN) injection 0.2 mg  0.2 mg Intravenous Q2 Min PRN Joel Ge MD        Or    naloxone (NARCAN) injection 0.4 mg  0.4 mg Intravenous Q2 Min PRN Joel Ge MD        Or    naloxone (NARCAN) injection 0.2 mg  0.2 mg Intramuscular Q2 Min PRN Joel Ge MD        Or    naloxone (NARCAN) injection 0.4 mg  0.4 mg Intramuscular Q2 Min PRN Joel Ge MD        oxyCODONE IR (ROXICODONE) half-tab 2.5 mg  2.5 mg Oral Q4H PRN Joel Ge MD   2.5 mg at 02/20/25 1114    Or    oxyCODONE (ROXICODONE) tablet 5 mg  5 mg Oral Q4H PRN Joel Ge MD   5 mg at 02/22/25 1819    sodium chloride (PF) 0.9% PF flush 3 mL  3 mL Intracatheter q1 min prn Joel eG MD   3 mL at 02/19/25 1212

## 2025-02-23 NOTE — PROGRESS NOTES
Pt Vitally stable on RA ex HTN, A1gb/w, needs met this shift, Back incision CDI, voids adequately  pain controlled w/ scheduled Tylenol,. L PIV SL, D/C home.

## 2025-02-23 NOTE — PROGRESS NOTES
"Progress Note    Post-operative Day: 5 Day Post-Op    Procedure(s):  Lumbar 3 to Sacral 1 Bilateral Decompression    Subjective:    Pain controlled. Sitting up in bed this am. Reports no pain in the legs this am. BÁRBARA overnight. Reports that she has remained active.Patient concerned regarding BP this am.     Objective:  Blood pressure (!) 163/91, pulse 91, temperature 98.3  F (36.8  C), temperature source Oral, resp. rate 18, height 1.575 m (5' 2\"), weight 99.5 kg (219 lb 6.4 oz), SpO2 99%.    Patient Vitals for the past 24 hrs:   BP Temp Temp src Pulse Resp SpO2   02/23/25 0411 (!) 163/91 98.3  F (36.8  C) Oral 91 18 99 %   02/22/25 2007 (!) 166/89 98  F (36.7  C) Oral 84 18 100 %   02/22/25 1700 (!) 169/94 -- -- 79 -- --   02/22/25 1601 (!) 175/85 97.7  F (36.5  C) Oral 72 18 97 %   02/22/25 1132 (!) 134/95 -- -- 78 -- --   02/22/25 1026 (!) 174/85 -- -- 83 -- --   02/22/25 0759 (!) 169/82 97.8  F (36.6  C) Oral 81 18 100 %       Wt Readings from Last 4 Encounters:   02/18/25 99.5 kg (219 lb 6.4 oz)   05/26/23 96.2 kg (212 lb)   03/17/23 95.7 kg (211 lb)     Motor function, sensation, and circulation intact   Yes  Wound status: incisions are clean dry and intact. Yes    Pertinent Labs   Lab Results: personally reviewed.     Recent Labs   Lab Test 03/24/23  1650   *     Plan:   1.Anticoagulation protocol: Ambulation and SCDs  2.Pain medications:  Current regimen, muscle relaxer PRN  3.Weight bearing status:  WBAT, AAT  4. Lumbar brace OOB only, does not need when in a chair.   5.Continue cares and rehabilitation   6. Hospital medicine consult for co-management   7. Drain discontinued  8. PT, encourage mobilization  9. Decadron drip discontinued   10.Incentive spirometer  11.Dispo: Medically cleared by medicine, able to discharge to home with home PT. Will need close follow-up with primary care regarding blood pressure management  12.Can resume ASA on discharge    Report completed by:  LUISA BECKHAM MD  Date: " 2/19/2025  Time: 7:15 AM

## 2025-02-23 NOTE — PROGRESS NOTES
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 Taking oxy PRN. Last dose 1630  A&Ox4  Voiding Yes  Mobility Assist of 1 BG/W  Tele n/a  CMS Intact  Lung Sounds Clear on RA via n/a  GI Active  Dressing CDI    Orders Placed This Encounter      Regular Diet Adult       Pt is hypertensive, has hydralazine for SBP >180. Discharge to home tomorrow.

## 2025-02-25 ENCOUNTER — PATIENT OUTREACH (OUTPATIENT)
Dept: CARE COORDINATION | Facility: CLINIC | Age: 80
End: 2025-02-25
Payer: COMMERCIAL

## 2025-02-25 NOTE — PROGRESS NOTES
[facility] Contact  [facility]/Voicemail     Clinical Data: Post-Discharge Outreach     Outreach attempted x 2.  Left message on patient's voicemail, providing [facility]'s central phone number of [phone] ([phone]) for questions/concerns and/or to schedule an appt with an [facility] provider, if they do not have a PCP.      Plan:  [facility] will do no further outreaches at this time.     RUSS Causey  [phone]  [facility]     *Connected Care Resource Team does NOT follow patient ongoing. Referrals are identified based on internal discharge reports and the outreach is to ensure patient has an understanding of their discharge instructions.

## 2025-02-27 NOTE — DISCHARGE SUMMARY
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             Discharge Medications:     Discharge Medication List as of 2/23/2025 11:49 AM        START taking these medications    Details   !! acetaminophen (TYLENOL) 500 MG tablet Take 1-2 tablets (500-1,000 mg) by mouth every 6 hours as needed for fever or pain., Disp-60 tablet, R-0, Local Print      hydrOXYzine HCl (ATARAX) 25 MG tablet Take 1 tablet (25 mg) by mouth every 6 hours as needed for itching (pain)., Disp-30 tablet, R-0, Local Print      oxyCODONE (ROXICODONE) 5 MG tablet Take 1 tablet (5 mg) by mouth every 6 hours as needed for severe pain., Disp-30 tablet, R-0, Local Print      senna-docusate (SENOKOT-S/PERICOLACE) 8.6-50 MG tablet Take 1 tablet by mouth daily., Disp-30 tablet, R-0, Local Print       !! - Potential duplicate medications found. Please discuss with provider.        CONTINUE these medications which have NOT CHANGED    Details   !! acetaminophen (TYLENOL) 500 MG tablet Take 1,000 mg by mouth every 6 hours as needed., Historical      aspirin (ASA) 81 MG EC tablet Take 81 mg by mouth daily., Historical      atorvastatin (LIPITOR) 10 MG tablet Take 1 tablet (10 mg) by mouth daily, Disp-90 tablet, R-3, E-Prescribe       empagliflozin (JARDIANCE) 10 MG TABS tablet Take 10 mg by mouth daily., Historical      folic acid (FOLVITE) 1 MG tablet Take 1 tablet by mouth, Historical      furosemide (LASIX) 40 MG tablet Take 40 mg by mouth daily., Historical      losartan (COZAAR) 50 MG tablet Take 1 tablet (50 mg) by mouth daily, Disp-90 tablet, R-3, E-Prescribe      metoprolol succinate ER (TOPROL XL) 25 MG 24 hr tablet Take 25 mg by mouth daily., Historical      pantoprazole (PROTONIX) 40 MG EC tablet Take 40 mg by mouth daily., Historical      vitamin D3 (CHOLECALCIFEROL) 250 mcg (11223 units) capsule Take 1 capsule by mouth daily., Historical       !! - Potential duplicate medications found. Please discuss with provider.                Consultations:   Consultation during this admission received from internal medicine.  No medical intervention was indicated.            Hospital Course:   John Lizama is a 80 year old female that underwent the above procedure without complications. During her hospital stay she was noted to be hypertensive and IM was consulted for willian op co-management. Patient was discharged to home with assist on POD#5          Discharge Instructions and Follow-Up:     Discharge diet: Regular   Discharge activity: Activity as tolerated   Discharge follow-up:   If sutures or staples were placed, please schedule an appointment for wound check and staple/suture removal in 2-3 weeks by calling [facility] at [phone] if not already scheduled           Discharge Disposition:     Discharged to home with home care      Attestation:  I have reviewed today's vital signs, notes, medications, labs and imaging.  Amount of time performed on this discharge summary: 10 minutes.    LUISA BECKHAM MD

## 2025-04-16 ENCOUNTER — APPOINTMENT (OUTPATIENT)
Dept: INTERPRETER SERVICES | Facility: CLINIC | Age: 80
End: 2025-04-16
Payer: COMMERCIAL

## 2025-04-19 NOTE — ANESTHESIA PROCEDURE NOTES
Airway       Patient location during procedure: OR       Procedure Start/Stop Times: 2/18/2025 7:41 AM  Staff -        CRNA: Kayce Arthur APRN CRNA       Performed By: CRNAIndications and Patient Condition       Indications for airway management: willian-procedural       Induction type:intravenous       Mask difficulty assessment: 1 - vent by mask    Final Airway Details       Final airway type: endotracheal airway       Successful airway: ETT - single  Endotracheal Airway Details        ETT size (mm): 7.0       Cuffed: yes       Successful intubation technique: video laryngoscopy       VL Blade Size: Glidescope 3       Grade View of Cords: 1       Adjucts: stylet       Position: Right       Measured from: lips       Secured at (cm): 22       Bite block used: Soft    Post intubation assessment        Placement verified by: capnometry and equal breath sounds        Number of attempts at approach: 1       Number of other approaches attempted: 0       Secured with: tape       Ease of procedure: easy       Dentition: Intact and Unchanged    Medication(s) Administered   Medication Administration Time: 2/18/2025 7:41 AM         RN made aware from PCA that pt is turning of bed alarm. Provider notified

## (undated) DEVICE — PACK UNIVERSAL SPLIT 29131

## (undated) DEVICE — SU ETHILON 2-0 FS 18" 664H

## (undated) DEVICE — SUCTION MANIFOLD NEPTUNE 2 SYS 4 PORT 0702-020-000

## (undated) DEVICE — DRAPE MICROSCOPE LEICA 54X150" AR8033650

## (undated) DEVICE — SPONGE COTTONOID 1/2X3" 80-1407

## (undated) DEVICE — SU VICRYL 0 UR-6 27" J603H

## (undated) DEVICE — LINEN TOWEL PACK X5 5464

## (undated) DEVICE — SU VICRYL 2-0 CT-2 CR 8X18" J726D

## (undated) DEVICE — SOL WATER IRRIG 1000ML BOTTLE 2F7114

## (undated) DEVICE — POSITIONER PT PRONESAFE HEAD REST W/DERMAPROX INSERT 40599

## (undated) DEVICE — ESU GROUND PAD UNIVERSAL W/O CORD

## (undated) DEVICE — GLOVE BIOGEL PI MICRO INDICATOR UNDERGLOVE SZ 7.0 48970

## (undated) DEVICE — DRAPE MAYO STAND 23X54 8337

## (undated) DEVICE — RX SURGIFLO HEMOSTATIC MATRIX W/THROMBIN 8ML 2994

## (undated) DEVICE — ESU BIPOLAR SEALER AQUAMANTYS 6MM 23-112-1

## (undated) DEVICE — SPONGE SURGIFOAM 12 1972

## (undated) DEVICE — TOOL DISSECT MIDAS MR8 14CM BALL DMD 3MM DIA MR8-14BA30D

## (undated) DEVICE — SU VICRYL 0 CT-1 CR 8X18" J740D

## (undated) DEVICE — GLOVE BIOGEL PI MICRO SZ 7.0 48570

## (undated) DEVICE — SU STRATAFIX PDS PLUS 2-0 SPIRAL CT-1 9" 22CM SXPP1B456

## (undated) DEVICE — PREP CHLORAPREP 26ML TINTED HI-LITE ORANGE 930815

## (undated) DEVICE — SPONGE COTTONOID 1/2X1/2" 80-1400

## (undated) DEVICE — PACK SPINE SM CUSTOM SNE15SSFSK

## (undated) DEVICE — DRAIN HEMOVAC RESERVOIR KIT 10FR 1/8" MED 00-2550-002-10

## (undated) RX ORDER — LABETALOL HYDROCHLORIDE 5 MG/ML
INJECTION, SOLUTION INTRAVENOUS
Status: DISPENSED
Start: 2025-02-18

## (undated) RX ORDER — HYDROMORPHONE HYDROCHLORIDE 1 MG/ML
INJECTION, SOLUTION INTRAMUSCULAR; INTRAVENOUS; SUBCUTANEOUS
Status: DISPENSED
Start: 2025-02-18

## (undated) RX ORDER — BUPIVACAINE HYDROCHLORIDE AND EPINEPHRINE 2.5; 5 MG/ML; UG/ML
INJECTION, SOLUTION EPIDURAL; INFILTRATION; INTRACAUDAL; PERINEURAL
Status: DISPENSED
Start: 2025-02-18

## (undated) RX ORDER — METHYLPREDNISOLONE ACETATE 40 MG/ML
INJECTION, SUSPENSION INTRA-ARTICULAR; INTRALESIONAL; INTRAMUSCULAR; SOFT TISSUE
Status: DISPENSED
Start: 2025-02-18

## (undated) RX ORDER — GABAPENTIN 100 MG/1
CAPSULE ORAL
Status: DISPENSED
Start: 2025-02-18

## (undated) RX ORDER — FENTANYL CITRATE 50 UG/ML
INJECTION, SOLUTION INTRAMUSCULAR; INTRAVENOUS
Status: DISPENSED
Start: 2025-02-18

## (undated) RX ORDER — ONDANSETRON 2 MG/ML
INJECTION INTRAMUSCULAR; INTRAVENOUS
Status: DISPENSED
Start: 2025-02-18

## (undated) RX ORDER — PROPOFOL 10 MG/ML
INJECTION, EMULSION INTRAVENOUS
Status: DISPENSED
Start: 2025-02-18